# Patient Record
Sex: FEMALE | Race: BLACK OR AFRICAN AMERICAN | Employment: FULL TIME | URBAN - METROPOLITAN AREA
[De-identification: names, ages, dates, MRNs, and addresses within clinical notes are randomized per-mention and may not be internally consistent; named-entity substitution may affect disease eponyms.]

---

## 2017-03-02 ENCOUNTER — OFFICE VISIT (OUTPATIENT)
Dept: INTERNAL MEDICINE CLINIC | Age: 37
End: 2017-03-02

## 2017-03-02 VITALS
DIASTOLIC BLOOD PRESSURE: 84 MMHG | RESPIRATION RATE: 18 BRPM | BODY MASS INDEX: 34.45 KG/M2 | HEIGHT: 65 IN | TEMPERATURE: 98.1 F | SYSTOLIC BLOOD PRESSURE: 123 MMHG | OXYGEN SATURATION: 98 % | HEART RATE: 80 BPM | WEIGHT: 206.8 LBS

## 2017-03-02 DIAGNOSIS — R68.89 COLD INTOLERANCE: ICD-10-CM

## 2017-03-02 DIAGNOSIS — G43.909 MIGRAINE WITHOUT STATUS MIGRAINOSUS, NOT INTRACTABLE, UNSPECIFIED MIGRAINE TYPE: ICD-10-CM

## 2017-03-02 DIAGNOSIS — R73.01 IFG (IMPAIRED FASTING GLUCOSE): ICD-10-CM

## 2017-03-02 DIAGNOSIS — B34.9 VIRAL SYNDROME: Primary | ICD-10-CM

## 2017-03-02 RX ORDER — TOPIRAMATE 50 MG/1
TABLET, FILM COATED ORAL
Qty: 60 TAB | Refills: 3 | Status: SHIPPED | OUTPATIENT
Start: 2017-03-02 | End: 2017-07-01 | Stop reason: SDUPTHER

## 2017-03-02 NOTE — PATIENT INSTRUCTIONS
Learning About Living Jamal  What is a living will? A living will is a legal form you use to write down the kind of care you want at the end of your life. It is used by the health professionals who will treat you if you aren't able to decide for yourself. If you put your wishes in writing, your loved ones and others will know what kind of care you want. They won't need to guess. This can ease your mind and be helpful to others. A living will is not the same as an estate or property will. An estate will explains what you want to happen with your money and property after you die. Is a living will a legal document? A living will is a legal document. Each state has its own laws about living smart. If you move to another state, make sure that your living will is legal in the state where you now live. Or you might use a universal form that has been approved by many states. This kind of form can sometimes be completed and stored online. Your electronic copy will then be available wherever you have a connection to the Internet. In most cases, doctors will respect your wishes even if you have a form from a different state. · You don't need an  to complete a living will. But legal advice can be helpful if your state's laws are unclear, your health history is complicated, or your family can't agree on what should be in your living will. · You can change your living will at any time. Some people find that their wishes about end-of-life care change as their health changes. · In addition to making a living will, think about completing a medical power of  form. This form lets you name the person you want to make end-of-life treatment decisions for you (your \"health care agent\") if you're not able to. Many hospitals and nursing homes will give you the forms you need to complete a living will and a medical power of .   · Your living will is used only if you can't make or communicate decisions for yourself anymore. If you become able to make decisions again, you can accept or refuse any treatment, no matter what you wrote in your living will. · Your state may offer an online registry. This is a place where you can store your living will online so the doctors and nurses who need to treat you can find it right away. What should you think about when creating a living will? Talk about your end-of-life wishes with your family members and your doctor. Let them know what you want. That way the people making decisions for you won't be surprised by your choices. Think about these questions as you make your living will:  · Do you know enough about life support methods that might be used? If not, talk to your doctor so you know what might be done if you can't breathe on your own, your heart stops, or you're unable to swallow. · What things would you still want to be able to do after you receive life-support methods? Would you want to be able to walk? To speak? To eat on your own? To live without the help of machines? · If you have a choice, where do you want to be cared for? In your home? At a hospital or nursing home? · Do you want certain Mandaen practices performed if you become very ill? · If you have a choice at the end of your life, where would you prefer to die? At home? In a hospital or nursing home? Somewhere else? · Would you prefer to be buried or cremated? · Do you want your organs to be donated after you die? What should you do with your living will? · Make sure that your family members and your health care agent have copies of your living will. · Give your doctor a copy of your living will to keep in your medical record. If you have more than one doctor, make sure that each one has a copy. · You may want to put a copy of your living will where it can be easily found. Where can you learn more? Go to http://keely-kacie.info/.   Enter C301 in the search box to learn more about \"Learning About Living Jamal. \"  Current as of: February 24, 2016  Content Version: 11.1  © 7622-6941 [employer]. Care instructions adapted under license by [employer] (which disclaims liability or warranty for this information). If you have questions about a medical condition or this instruction, always ask your healthcare professional. [address] any warranty or liability for your use of this information. Viral Infections: Care Instructions  Your Care Instructions  You don't feel well, but it's not clear what's causing it. You may have a viral infection. Viruses cause many illnesses, such as the common cold, influenza, fever, rashes, and the diarrhea, nausea, and vomiting that are often called \"stomach flu. \" You may wonder if antibiotic medicines could make you feel better. But antibiotics only treat infections caused by bacteria. They don't work on viruses. The good news is that viral infections usually aren't serious. Most will go away in a few days without medical treatment. In the meantime, there are a few things you can do to make yourself more comfortable. Follow-up care is a key part of your treatment and safety. Be sure to make and go to all appointments, and call your doctor if you are having problems. It's also a good idea to know your test results and keep a list of the medicines you take. How can you care for yourself at home? · Get plenty of rest if you feel tired. · Take an over-the-counter pain medicine if needed, such as acetaminophen (Tylenol), ibuprofen (Advil, Motrin), or naproxen (Aleve). Read and follow all instructions on the label. · Be careful when taking over-the-counter cold or flu medicines and Tylenol at the same time. Many of these medicines have acetaminophen, which is Tylenol. Read the labels to make sure that you are not taking more than the recommended dose. Too much acetaminophen (Tylenol) can be harmful.   · Drink plenty of fluids, enough so that your urine is light yellow or clear like water. If you have kidney, heart, or liver disease and have to limit fluids, talk with your doctor before you increase the amount of fluids you drink. · Stay home from work, school, and other public places while you have a fever. When should you call for help? Call 911 anytime you think you may need emergency care. For example, call if:  · You have severe trouble breathing. · You passed out (lost consciousness). Call your doctor now or seek immediate medical care if:  · You seem to be getting much sicker. · You have a new or higher fever. · You have blood in your stools. · You have new belly pain, or your pain gets worse. · You have a new rash. Watch closely for changes in your health, and be sure to contact your doctor if:  · You start to get better and then get worse. · You do not get better as expected. Where can you learn more? Go to http://keely-kacie.info/. Enter W142 in the search box to learn more about \"Viral Infections: Care Instructions. \"  Current as of: May 24, 2016  Content Version: 11.1  © 3104-1233 [employer], "[employer]. Care instructions adapted under license by [employer] (which disclaims liability or warranty for this information). If you have questions about a medical condition or this instruction, always ask your healthcare professional. [place] [address] any warranty or liability for your use of this information.

## 2017-03-02 NOTE — MR AVS SNAPSHOT
Visit Information Date & Time Provider Department Dept. Phone Encounter #  
 3/2/2017 11:30 AM Troy Morales [id number] [phone] [id number] Upcoming Health Maintenance Date Due Pneumococcal 19-64 Medium Risk (1 of 1 - PPSV23) 1/5/2000 DTaP/Tdap/Td series (1 - Tdap) 1/5/2002 INFLUENZA AGE 9 TO ADULT 8/1/2016 PAP AKA CERVICAL CYTOLOGY 2/18/2018 Allergies as of 3/2/2017  Review Complete On: 3/2/2017 By: Babak Brooks NP Severity Noted Reaction Type Reactions Penicillin G  12/15/2012    Rash Current Immunizations  Reviewed on 3/24/2014 Name Date Influenza Vaccine (Quad) PF  Incomplete MMR 7/27/2013  1:35 PM  
 TB Skin Test (PPD) Intradermal 3/21/2014 Not reviewed this visit You Were Diagnosed With   
  
 Codes Comments Viral syndrome    -  Primary ICD-10-CM: B34.9 ICD-9-CM: 079.99 Migraine without status migrainosus, not intractable, unspecified migraine type     ICD-10-CM: G43.909 ICD-9-CM: 346.90 IFG (impaired fasting glucose)     ICD-10-CM: R73.01 
ICD-9-CM: 790.21 Cold intolerance     ICD-10-CM: R68.89 ICD-9-CM: 780.99 Vitals BP  
  
  
  
  
  
 123/84 (BP 1 Location: Right arm, BP Patient Position: Sitting) BMI and BSA Data Body Mass Index Body Surface Area 34.41 kg/m 2 2.07 m 2 Preferred Pharmacy Pharmacy Name Phone [id number] [phone] Your Updated Medication List  
  
   
This list is accurate as of: 3/2/17 12:19 PM.  Always use your most recent med list.  
  
  
  
  
 SLOW FE PO Take 1 Tab by mouth daily. topiramate 50 mg tablet Commonly known as:  TOPAMAX TAKE 1 TABLET BY MOUTH TWICE A DAY Prescriptions Sent to Pharmacy Refills  
 topiramate (TOPAMAX) 50 mg tablet 3 Sig: TAKE 1 TABLET BY MOUTH TWICE A DAY  Class: Normal  
 Pharmacy: [facility][id number] [address], [address] [facility] #: [phone] We Performed the Following CBC WITH AUTOMATED DIFF [05396 CPT(R)] HEMOGLOBIN A1C WITH EAG [43152 CPT(R)] LIPID PANEL [52300 CPT(R)] METABOLIC PANEL, COMPREHENSIVE [30897 CPT(R)] TSH 3RD GENERATION [56575 CPT(R)] Patient Instructions [address] What is a living will? A living will is a legal form you use to write down the kind of care you want at the end of your life. It is used by the health professionals who will treat you if you aren't able to decide for yourself. If you put your wishes in writing, your loved ones and others will know what kind of care you want. They won't need to guess. This can ease your mind and be helpful to others. A living will is not the same as an estate or property will. An estate will explains what you want to happen with your money and property after you die. Is a living will a legal document? A living will is a legal document. Each state has its own laws about living smart. If you move to another state, make sure that your living will is legal in the state where you now live. Or you might use a universal form that has been approved by many states. This kind of form can sometimes be completed and stored online. Your electronic copy will then be available wherever you have a connection to the Internet. In most cases, doctors will respect your wishes even if you have a form from a different state. · You don't need an  to complete a living will. But legal advice can be helpful if your state's laws are unclear, your health history is complicated, or your family can't agree on what should be in your living will. · You can change your living will at any time. Some people find that their wishes about end-of-life care change as their health changes. · In addition to making a living will, think about completing a medical power of  form. This form lets you name the person you want to make end-of-life treatment decisions for you (your \"health care agent\") if you're not able to. Many hospitals and nursing homes will give you the forms you need to complete a living will and a medical power of . · Your living will is used only if you can't make or communicate decisions for yourself anymore. If you become able to make decisions again, you can accept or refuse any treatment, no matter what you wrote in your living will. · Your state may offer an online registry. This is a place where you can store your living will online so the doctors and nurses who need to treat you can find it right away. What should you think about when creating a living will? Talk about your end-of-life wishes with your family members and your doctor. Let them know what you want. That way the people making decisions for you won't be surprised by your choices. Think about these questions as you make your living will: · Do you know enough about life support methods that might be used? If not, talk to your doctor so you know what might be done if you can't breathe on your own, your heart stops, or you're unable to swallow. · What things would you still want to be able to do after you receive life-support methods? Would you want to be able to walk? To speak? To eat on your own? To live without the help of machines? · If you have a choice, where do you want to be cared for? In your home? At a hospital or nursing home? · Do you want certain Anabaptism practices performed if you become very ill? · If you have a choice at the end of your life, where would you prefer to die? At home? In a hospital or nursing home? Somewhere else? · Would you prefer to be buried or cremated? · Do you want your organs to be donated after you die? What should you do with your living will? · Make sure that your family members and your health care agent have copies of your living will. · Give your doctor a copy of your living will to keep in your medical record. If you have more than one doctor, make sure that each one has a copy. · You may want to put a copy of your living will where it can be easily found. Where can you learn more? Go to http://keely-kacie.info/. Enter L502 in the search box to learn more about \"Learning About Living Jamal. \" Current as of: February 24, 2016 Content Version: 11.1 © 7398-6616 [employer]. Care instructions adapted under license by [employer] (which disclaims liability or warranty for this information). If you have questions about a medical condition or this instruction, always ask your healthcare professional. [address] any warranty or liability for your use of this information. Viral Infections: Care Instructions Your Care Instructions You don't feel well, but it's not clear what's causing it. You may have a viral infection. Viruses cause many illnesses, such as the common cold, influenza, fever, rashes, and the diarrhea, nausea, and vomiting that are often called \"stomach flu. \" You may wonder if antibiotic medicines could make you feel better. But antibiotics only treat infections caused by bacteria. They don't work on viruses. The good news is that viral infections usually aren't serious. Most will go away in a few days without medical treatment. In the meantime, there are a few things you can do to make yourself more comfortable. Follow-up care is a key part of your treatment and safety. Be sure to make and go to all appointments, and call your doctor if you are having problems. It's also a good idea to know your test results and keep a list of the medicines you take. How can you care for yourself at home? · Get plenty of rest if you feel tired. · Take an over-the-counter pain medicine if needed, such as acetaminophen (Tylenol), ibuprofen (Advil, Motrin), or naproxen (Aleve). Read and follow all instructions on the label. · Be careful when taking over-the-counter cold or flu medicines and Tylenol at the same time. Many of these medicines have acetaminophen, which is Tylenol. Read the labels to make sure that you are not taking more than the recommended dose. Too much acetaminophen (Tylenol) can be harmful. · Drink plenty of fluids, enough so that your urine is light yellow or clear like water. If you have kidney, heart, or liver disease and have to limit fluids, talk with your doctor before you increase the amount of fluids you drink. · Stay home from work, school, and other public places while you have a fever. When should you call for help? Call 911 anytime you think you may need emergency care. For example, call if: 
· You have severe trouble breathing. · You passed out (lost consciousness). Call your doctor now or seek immediate medical care if: 
· You seem to be getting much sicker. · You have a new or higher fever. · You have blood in your stools. · You have new belly pain, or your pain gets worse. · You have a new rash. Watch closely for changes in your health, and be sure to contact your doctor if: 
· You start to get better and then get worse. · You do not get better as expected. Where can you learn more? Go to http://keely-kacie.info/. Enter A921 in the search box to learn more about \"Viral Infections: Care Instructions. \" Current as of: May 24, 2016 Content Version: 11.1 © 9205-0150 [employer]. Care instructions adapted under license by [employer] (which disclaims liability or warranty for this information).  If you have questions about a medical condition or this instruction, always ask your healthcare professional. [employer] disclaims any warranty or liability for your use of this information. Introducing [facility]! Dear Laura Burnette: Thank you for requesting a Leadjini account. Our records indicate that you have previously registered for a Leadjini account but its currently inactive. Please call our Leadjini support line at [phone]. Additional Information If you have questions, please visit the Frequently Asked Questions section of the Leadjini website at [email or web address]/Tears for Lifet/. Remember, Leadjini is NOT to be used for urgent needs. For medical emergencies, dial 911. Now available from your iPhone and Android! Please provide this summary of care documentation to your next provider. Your primary care clinician is listed as Kirsten Aguiar. If you have any questions after today's visit, please call [phone].

## 2017-03-02 NOTE — PROGRESS NOTES
HISTORY OF PRESENT ILLNESS  Kassi Barker is a 40 y.o. female. HPI  Believes she is getting over a virus. less body ache than earlier this week. Diarrhea and vomiting resolved. Still fatigued. Denies sick contact. No influenza vaccine    Recurrent migraine type headcahes, OTC migraine medication effective    Dr. Tre Cazares gynecologist.. [facility] ablation to stop heavy bleeding, still bleeds but not as heavy. Still has cold intolerance. Not taking iron replacement. No recent evaluation of anemia. Past Medical History:   Diagnosis Date    Anemia NEC     on fe pill    Migraine     Postpartum depression     years ago       Current Outpatient Prescriptions on File Prior to Visit   Medication Sig Dispense Refill    FERROUS SULFATE (SLOW FE PO) Take 1 Tab by mouth daily. No current facility-administered medications on file prior to visit. Review of Systems   Constitutional: Positive for malaise/fatigue. Negative for chills, diaphoresis and fever. HENT: Negative for congestion and sore throat. Respiratory: Negative. Cardiovascular: Negative. Gastrointestinal: Negative. Genitourinary: Negative. Skin: Negative. Neurological: Positive for dizziness and headaches. Negative for weakness. Psychiatric/Behavioral: Negative. Physical Exam   Constitutional: She is oriented to person, place, and time. She appears well-developed and well-nourished. No distress. HENT:   Right Ear: External ear normal.   Left Ear: External ear normal.   Nose: Nose normal.   Mouth/Throat: Oropharynx is clear and moist. No oropharyngeal exudate. Eyes: Conjunctivae are normal. Right eye exhibits no discharge. Left eye exhibits no discharge. Neck: Normal range of motion. Neck supple. No thyromegaly present. Cardiovascular: Normal rate and regular rhythm. Pulmonary/Chest: Effort normal and breath sounds normal.   Musculoskeletal: She exhibits no edema or tenderness.    Lymphadenopathy:     She has no cervical adenopathy. Neurological: She is alert and oriented to person, place, and time. No cranial nerve deficit. Skin: Skin is warm and dry. She is not diaphoretic. Psychiatric: She has a normal mood and affect. Her behavior is normal.       ASSESSMENT and PLAN    ICD-10-CM ICD-9-CM    1. Viral syndrome B34.9 079.99    2. Migraine without status migrainosus, not intractable, unspecified migraine type G43.909 346.90 topiramate (TOPAMAX) 50 mg tablet   3. IFG (impaired fasting glucose) R73.01 790.21 LIPID PANEL      METABOLIC PANEL, COMPREHENSIVE      HEMOGLOBIN A1C WITH EAG   4. Cold intolerance R68.89 780.99 LIPID PANEL      TSH 3RD GENERATION      CBC WITH AUTOMATED DIFF     Follow-up Disposition:  Return if symptoms worsen or fail to improve. 1.Encouraged supportive care    2. Start prophylaxis medications    3. Instructed to take iron replacement twice daily.  Follow up with gynecologist to discuss DUB

## 2017-03-02 NOTE — PROGRESS NOTES
RM#8  Chief Complaint   Patient presents with    Flu      generalized body aches, pains, fatigued, vomitting &diarrhea x 2 days     1. Have you been to the ER, urgent care clinic since your last visit? Hospitalized since your last visit? No    2. Have you seen or consulted any other health care providers outside of the [facility] since your last visit? Include any pap smears or colon screening.  No  No living will ADV

## 2017-03-09 LAB
ALBUMIN SERPL-MCNC: 3.9 G/DL (ref 3.5–5.5)
ALBUMIN/GLOB SERPL: 1.3 {RATIO} (ref 1.1–2.5)
ALP SERPL-CCNC: 52 IU/L (ref 39–117)
ALT SERPL-CCNC: 15 IU/L (ref 0–32)
AST SERPL-CCNC: 15 IU/L (ref 0–40)
BASOPHILS # BLD AUTO: 0 X10E3/UL (ref 0–0.2)
BASOPHILS NFR BLD AUTO: 1 %
BILIRUB SERPL-MCNC: <0.2 MG/DL (ref 0–1.2)
BUN SERPL-MCNC: 14 MG/DL (ref 6–20)
BUN/CREAT SERPL: 20 (ref 8–20)
CALCIUM SERPL-MCNC: 8.7 MG/DL (ref 8.7–10.2)
CHLORIDE SERPL-SCNC: 106 MMOL/L (ref 96–106)
CHOLEST SERPL-MCNC: 101 MG/DL (ref 100–199)
CO2 SERPL-SCNC: 20 MMOL/L (ref 18–29)
CREAT SERPL-MCNC: 0.71 MG/DL (ref 0.57–1)
EOSINOPHIL # BLD AUTO: 0.1 X10E3/UL (ref 0–0.4)
EOSINOPHIL NFR BLD AUTO: 1 %
ERYTHROCYTE [DISTWIDTH] IN BLOOD BY AUTOMATED COUNT: 14.5 % (ref 12.3–15.4)
EST. AVERAGE GLUCOSE BLD GHB EST-MCNC: 134 MG/DL
GLOBULIN SER CALC-MCNC: 2.9 G/DL (ref 1.5–4.5)
GLUCOSE SERPL-MCNC: 101 MG/DL (ref 65–99)
HBA1C MFR BLD: 6.3 % (ref 4.8–5.6)
HCT VFR BLD AUTO: 38.9 % (ref 34–46.6)
HDLC SERPL-MCNC: 42 MG/DL
HGB BLD-MCNC: 12.8 G/DL (ref 11.1–15.9)
IMM GRANULOCYTES # BLD: 0 X10E3/UL (ref 0–0.1)
IMM GRANULOCYTES NFR BLD: 0 %
LDLC SERPL CALC-MCNC: 46 MG/DL (ref 0–99)
LYMPHOCYTES # BLD AUTO: 2.8 X10E3/UL (ref 0.7–3.1)
LYMPHOCYTES NFR BLD AUTO: 44 %
MCH RBC QN AUTO: 28 PG (ref 26.6–33)
MCHC RBC AUTO-ENTMCNC: 32.9 G/DL (ref 31.5–35.7)
MCV RBC AUTO: 85 FL (ref 79–97)
MONOCYTES # BLD AUTO: 0.5 X10E3/UL (ref 0.1–0.9)
MONOCYTES NFR BLD AUTO: 7 %
NEUTROPHILS # BLD AUTO: 2.9 X10E3/UL (ref 1.4–7)
NEUTROPHILS NFR BLD AUTO: 47 %
PLATELET # BLD AUTO: 292 X10E3/UL (ref 150–379)
POTASSIUM SERPL-SCNC: 4.2 MMOL/L (ref 3.5–5.2)
PROT SERPL-MCNC: 6.8 G/DL (ref 6–8.5)
RBC # BLD AUTO: 4.57 X10E6/UL (ref 3.77–5.28)
SODIUM SERPL-SCNC: 141 MMOL/L (ref 134–144)
TRIGL SERPL-MCNC: 65 MG/DL (ref 0–149)
TSH SERPL DL<=0.005 MIU/L-ACNC: 1.01 UIU/ML (ref 0.45–4.5)
VLDLC SERPL CALC-MCNC: 13 MG/DL (ref 5–40)
WBC # BLD AUTO: 6.2 X10E3/UL (ref 3.4–10.8)

## 2017-04-02 ENCOUNTER — APPOINTMENT (OUTPATIENT)
Dept: ULTRASOUND IMAGING | Age: 37
End: 2017-04-02
Attending: EMERGENCY MEDICINE
Payer: COMMERCIAL

## 2017-04-02 ENCOUNTER — HOSPITAL ENCOUNTER (EMERGENCY)
Age: 37
Discharge: HOME | End: 2017-04-02
Attending: EMERGENCY MEDICINE
Payer: COMMERCIAL

## 2017-04-02 VITALS
DIASTOLIC BLOOD PRESSURE: 77 MMHG | HEIGHT: 65 IN | RESPIRATION RATE: 20 BRPM | BODY MASS INDEX: 34.34 KG/M2 | HEART RATE: 88 BPM | OXYGEN SATURATION: 100 % | SYSTOLIC BLOOD PRESSURE: 133 MMHG | TEMPERATURE: 98.3 F | WEIGHT: 206.13 LBS

## 2017-04-02 DIAGNOSIS — D25.9 UTERINE LEIOMYOMA, UNSPECIFIED LOCATION: Primary | ICD-10-CM

## 2017-04-02 LAB
ALBUMIN SERPL BCP-MCNC: 3.6 G/DL (ref 3.5–5)
ALBUMIN/GLOB SERPL: 1 {RATIO} (ref 1.1–2.2)
ALP SERPL-CCNC: 56 U/L (ref 45–117)
ALT SERPL-CCNC: 19 U/L (ref 12–78)
ANION GAP BLD CALC-SCNC: 12 MMOL/L (ref 5–15)
APPEARANCE UR: ABNORMAL
AST SERPL W P-5'-P-CCNC: 10 U/L (ref 15–37)
BACTERIA URNS QL MICRO: ABNORMAL /HPF
BASOPHILS # BLD AUTO: 0 K/UL (ref 0–0.1)
BASOPHILS # BLD: 0 % (ref 0–1)
BILIRUB SERPL-MCNC: 0.1 MG/DL (ref 0.2–1)
BILIRUB UR QL: NEGATIVE
BUN SERPL-MCNC: 9 MG/DL (ref 6–20)
BUN/CREAT SERPL: 12 (ref 12–20)
CALCIUM SERPL-MCNC: 8.2 MG/DL (ref 8.5–10.1)
CHLORIDE SERPL-SCNC: 110 MMOL/L (ref 97–108)
CLUE CELLS VAG QL WET PREP: NORMAL
CO2 SERPL-SCNC: 22 MMOL/L (ref 21–32)
COLOR UR: ABNORMAL
CREAT SERPL-MCNC: 0.76 MG/DL (ref 0.55–1.02)
EOSINOPHIL # BLD: 0.1 K/UL (ref 0–0.4)
EOSINOPHIL NFR BLD: 1 % (ref 0–7)
EPITH CASTS URNS QL MICRO: ABNORMAL /LPF
ERYTHROCYTE [DISTWIDTH] IN BLOOD BY AUTOMATED COUNT: 15 % (ref 11.5–14.5)
GLOBULIN SER CALC-MCNC: 3.7 G/DL (ref 2–4)
GLUCOSE SERPL-MCNC: 92 MG/DL (ref 65–100)
GLUCOSE UR STRIP.AUTO-MCNC: NEGATIVE MG/DL
HCG UR QL: NEGATIVE
HCT VFR BLD AUTO: 37.7 % (ref 35–47)
HGB BLD-MCNC: 12.4 G/DL (ref 11.5–16)
HGB UR QL STRIP: NEGATIVE
HYALINE CASTS URNS QL MICRO: ABNORMAL /LPF (ref 0–5)
KETONES UR QL STRIP.AUTO: NEGATIVE MG/DL
KOH PREP SPEC: NORMAL
LEUKOCYTE ESTERASE UR QL STRIP.AUTO: ABNORMAL
LYMPHOCYTES # BLD AUTO: 41 % (ref 12–49)
LYMPHOCYTES # BLD: 3 K/UL (ref 0.8–3.5)
MCH RBC QN AUTO: 27.5 PG (ref 26–34)
MCHC RBC AUTO-ENTMCNC: 32.9 G/DL (ref 30–36.5)
MCV RBC AUTO: 83.6 FL (ref 80–99)
MONOCYTES # BLD: 0.6 K/UL (ref 0–1)
MONOCYTES NFR BLD AUTO: 8 % (ref 5–13)
NEUTS SEG # BLD: 3.7 K/UL (ref 1.8–8)
NEUTS SEG NFR BLD AUTO: 50 % (ref 32–75)
NITRITE UR QL STRIP.AUTO: NEGATIVE
PH UR STRIP: 6 [PH] (ref 5–8)
PLATELET # BLD AUTO: 263 K/UL (ref 150–400)
POTASSIUM SERPL-SCNC: 3.8 MMOL/L (ref 3.5–5.1)
PROT SERPL-MCNC: 7.3 G/DL (ref 6.4–8.2)
PROT UR STRIP-MCNC: NEGATIVE MG/DL
RBC # BLD AUTO: 4.51 M/UL (ref 3.8–5.2)
RBC #/AREA URNS HPF: ABNORMAL /HPF (ref 0–5)
SERVICE CMNT-IMP: NORMAL
SODIUM SERPL-SCNC: 144 MMOL/L (ref 136–145)
SP GR UR REFRACTOMETRY: 1.02 (ref 1–1.03)
T VAGINALIS VAG QL WET PREP: NORMAL
UA: UC IF INDICATED,UAUC: ABNORMAL
UROBILINOGEN UR QL STRIP.AUTO: 0.2 EU/DL (ref 0.2–1)
WBC # BLD AUTO: 7.3 K/UL (ref 3.6–11)
WBC URNS QL MICRO: ABNORMAL /HPF (ref 0–4)

## 2017-04-02 PROCEDURE — 87210 SMEAR WET MOUNT SALINE/INK: CPT | Performed by: EMERGENCY MEDICINE

## 2017-04-02 PROCEDURE — 80053 COMPREHEN METABOLIC PANEL: CPT | Performed by: EMERGENCY MEDICINE

## 2017-04-02 PROCEDURE — 85025 COMPLETE CBC W/AUTO DIFF WBC: CPT | Performed by: EMERGENCY MEDICINE

## 2017-04-02 PROCEDURE — 87491 CHLMYD TRACH DNA AMP PROBE: CPT | Performed by: EMERGENCY MEDICINE

## 2017-04-02 PROCEDURE — 99284 EMERGENCY DEPT VISIT MOD MDM: CPT

## 2017-04-02 PROCEDURE — 87086 URINE CULTURE/COLONY COUNT: CPT | Performed by: EMERGENCY MEDICINE

## 2017-04-02 PROCEDURE — 74011250637 HC RX REV CODE- 250/637: Performed by: EMERGENCY MEDICINE

## 2017-04-02 PROCEDURE — 81001 URINALYSIS AUTO W/SCOPE: CPT | Performed by: EMERGENCY MEDICINE

## 2017-04-02 PROCEDURE — 36415 COLL VENOUS BLD VENIPUNCTURE: CPT | Performed by: EMERGENCY MEDICINE

## 2017-04-02 PROCEDURE — 81025 URINE PREGNANCY TEST: CPT

## 2017-04-02 PROCEDURE — 76856 US EXAM PELVIC COMPLETE: CPT

## 2017-04-02 PROCEDURE — 76830 TRANSVAGINAL US NON-OB: CPT

## 2017-04-02 RX ORDER — HYDROCODONE BITARTRATE AND ACETAMINOPHEN 7.5; 325 MG/1; MG/1
1 TABLET ORAL
Status: COMPLETED | OUTPATIENT
Start: 2017-04-02 | End: 2017-04-02

## 2017-04-02 RX ORDER — HYDROCODONE BITARTRATE AND ACETAMINOPHEN 7.5; 325 MG/1; MG/1
1 TABLET ORAL
Qty: 20 TAB | Refills: 0 | Status: SHIPPED | OUTPATIENT
Start: 2017-04-02 | End: 2017-09-08

## 2017-04-02 RX ADMIN — HYDROCODONE BITARTRATE AND ACETAMINOPHEN 1 TABLET: 7.5; 325 TABLET ORAL at 10:37

## 2017-04-02 NOTE — ED PROVIDER NOTES
HPI Comments: Connie Parikh is a 40 y.o. female, pmhx significant for fibroids, and anemia, who presents ambulatory to the ED c/o suprapubic and right pelvic pain x 3 days. Pt reports an increase in swelling in her RLQ and right pelvic region, stating that she had a difficult time buttoning her jeans which usually fit loosely. Pt states that her pain is made worse with strenuous movements, noting that she has relief when she stops walking, but the pain immediately returns when she starts walking. Pt endorses concern that this is a flare up of her fibroids, which she hasn't had since she gave birth to her son. Pt states that she had the NovaSure procedure, which relieved some of her fibroid sxs. Pt notes that her LMP ended on 3/27/17. Pt denies dysuria, nausea, vomiting, vaginal bleeding, abnormal changes to her appetite, or concern for STD. PCP: Helena García NP  OB GYN: Ophelia Burns MD    PSHx: Significant for dilation and curettage x 4, bilateral tubal ligation  Social Hx: + tobacco (1ppd), + EtOH (weekly), - Illicit Drugs (cocaine - last use 2015)    There are no other complaints, changes, or physical findings at this time. The history is provided by the patient. No  was used.         Past Medical History:   Diagnosis Date    Anemia NEC     on fe pill    Migraine     Postpartum depression     years ago       Past Surgical History:   Procedure Laterality Date    HX DILATION AND CURETTAGE  1998,2000,2001,2003    x4    HX GYN      BTL 2013         Family History:   Problem Relation Age of Onset    Diabetes Mother     Hypertension Mother     High Cholesterol Mother     Stroke Mother     Cancer Maternal Grandmother    [facility] Migraines Sister     Migraines Brother        Social History     Social History    Marital status:      Spouse name: Micah Aquino Number of children: 1    Years of education: 15     Occupational History     Not Employed     Social History Main Topics    Smoking status: Current Every Day Smoker     Packs/day: 1.00     Years: 10.00     Last attempt to quit: 4/8/2013    Smokeless tobacco: Never Used    Alcohol use 3.0 oz/week     2 Cans of beer, 3 Shots of liquor per week    Drug use: 1.00 per week     Special: Cocaine      Comment: last cocaine use 2005 per 10/1/15 PAT call    Sexual activity: Yes     Partners: Male     Birth control/ protection: Surgical      Comment: tubes tied     Other Topics Concern     Service No    Blood Transfusions No    Caffeine Concern No    Occupational Exposure No    Hobby Hazards No    Sleep Concern No    Stress Concern Yes    Weight Concern No    Special Diet No    Back Care No    Exercise No    Seat Belt Yes    Self-Exams Yes     Social History Narrative         ALLERGIES: Penicillin g    Review of Systems   Constitutional: Negative for chills, fatigue and fever. HENT: Negative for congestion, rhinorrhea and sore throat. Eyes: Negative for pain, discharge and visual disturbance. Respiratory: Negative for cough, chest tightness, shortness of breath and wheezing. Cardiovascular: Negative for chest pain, palpitations and leg swelling. Gastrointestinal: Positive for abdominal pain (suprapubic). Negative for constipation, diarrhea, nausea and vomiting. Genitourinary: Positive for pelvic pain. Negative for dysuria, hematuria, vaginal bleeding and vaginal discharge. Musculoskeletal: Negative for arthralgias, back pain and myalgias. Skin: Negative for rash. Neurological: Negative for dizziness, weakness, light-headedness and headaches. Psychiatric/Behavioral: Negative. Vitals:    04/02/17 1001 04/02/17 1425   BP: 133/77    Pulse: 88    Resp: 20    Temp: 98.3 °F (36.8 °C)    SpO2: 99% 100%   Weight: 93.5 kg (206 lb 2.1 oz)    Height: 5' 5\" (1.651 m)             Physical Exam   Constitutional: She is oriented to person, place, and time.  She appears well-developed and well-nourished. No distress. HENT:   Head: Normocephalic and atraumatic. Eyes: EOM are normal. Right eye exhibits no discharge. Left eye exhibits no discharge. No scleral icterus. Neck: Normal range of motion. Neck supple. No tracheal deviation present. Cardiovascular: Normal rate, regular rhythm, normal heart sounds and intact distal pulses. Exam reveals no gallop and no friction rub. No murmur heard. Pulmonary/Chest: Effort normal and breath sounds normal. No respiratory distress. She has no wheezes. She has no rales. Abdominal: Soft. She exhibits no distension. There is tenderness. There is no rebound and no guarding. Suprapubic and right pelvic pain   Genitourinary:   Genitourinary Comments: PELVIC EXAM:   Right adnexal tenderness  Left adnexal non-tender  No discharge  No cervical motion tenderness    Musculoskeletal: Normal range of motion. She exhibits no edema. Lymphadenopathy:     She has no cervical adenopathy. Neurological: She is alert and oriented to person, place, and time. No focal neuro deficits   Skin: Skin is warm and dry. No rash noted. Psychiatric: She has a normal mood and affect. Nursing note and vitals reviewed. MDM  Number of Diagnoses or Management Options  Uterine leiomyoma, unspecified location:   Diagnosis management comments:     Differentia includes fibroids, pregnancy, ovarian cyst/torsion, STI, PID, UTI, appendicitis. - CBC, CMP  - UA, UPT  - Pelvic exam and labs  - TVUS         Amount and/or Complexity of Data Reviewed  Clinical lab tests: reviewed and ordered  Review and summarize past medical records: yes    Patient Progress  Patient progress: stable    ED Course       Procedures     PROGRESS NOTE   12:00 PM  Pt re-evaluated prior to pelvic exam. She states that her pain has improved after medications given to her in the ED.    Written by Jazzmine Jackson ED Scribe, as dictated by Leeanna Rey MD.     Procedure Note - Pelvic Exam: 12:04 PM  Performed by: Marixa Fairbanks MD  Chaperoned by: Leora Lopez RN  Pelvic exam was performed using bimanual and speculum. Further findings noted in physical exam.   The procedure took 1-15 minutes, and pt tolerated well. Written by Rozanna Cooks, ED Scribe, as dictated by Marixa Fairbanks MD.    PROGRESS NOTE   2:23 PM  Pt was re-evaluated. Pt states that her pain is more suprapubic, and consistent with prior episodes of pain associated with fibroids. On exam, pain is more suprapubic with no RLQ tenderness. Do not suspect appendicitis at this time. Pain is likely secondary to fibroids. Will discharge with f/u with OB GYN - pt is in agreement with plan. Discussed results, prescriptions and follow up plan with patient. Provided customary return to ED instructions. Patient expressed understanding. Erna Lackey MD    LABORATORY TESTS:  Recent Results (from the past 12 hour(s))   HCG URINE, QL. - POC    Collection Time: 04/02/17 10:38 AM   Result Value Ref Range    Pregnancy test,urine (POC) NEGATIVE  NEG     CBC WITH AUTOMATED DIFF    Collection Time: 04/02/17 10:40 AM   Result Value Ref Range    WBC 7.3 3.6 - 11.0 K/uL    RBC 4.51 3.80 - 5.20 M/uL    HGB 12.4 11.5 - 16.0 g/dL    HCT 37.7 35.0 - 47.0 %    MCV 83.6 80.0 - 99.0 FL    MCH 27.5 26.0 - 34.0 PG    MCHC 32.9 30.0 - 36.5 g/dL    RDW 15.0 (H) 11.5 - 14.5 %    PLATELET 470 275 - 485 K/uL    NEUTROPHILS 50 32 - 75 %    LYMPHOCYTES 41 12 - 49 %    MONOCYTES 8 5 - 13 %    EOSINOPHILS 1 0 - 7 %    BASOPHILS 0 0 - 1 %    ABS. NEUTROPHILS 3.7 1.8 - 8.0 K/UL    ABS. LYMPHOCYTES 3.0 0.8 - 3.5 K/UL    ABS. MONOCYTES 0.6 0.0 - 1.0 K/UL    ABS. EOSINOPHILS 0.1 0.0 - 0.4 K/UL    ABS.  BASOPHILS 0.0 0.0 - 0.1 K/UL   METABOLIC PANEL, COMPREHENSIVE    Collection Time: 04/02/17 10:40 AM   Result Value Ref Range    Sodium 144 136 - 145 mmol/L    Potassium 3.8 3.5 - 5.1 mmol/L    Chloride 110 (H) 97 - 108 mmol/L    CO2 22 21 - 32 mmol/L Anion gap 12 5 - 15 mmol/L    Glucose 92 65 - 100 mg/dL    BUN 9 6 - 20 MG/DL    Creatinine 0.76 0.55 - 1.02 MG/DL    BUN/Creatinine ratio 12 12 - 20      GFR est AA >60 >60 ml/min/1.73m2    GFR est non-AA >60 >60 ml/min/1.73m2    Calcium 8.2 (L) 8.5 - 10.1 MG/DL    Bilirubin, total 0.1 (L) 0.2 - 1.0 MG/DL    ALT (SGPT) 19 12 - 78 U/L    AST (SGOT) 10 (L) 15 - 37 U/L    Alk. phosphatase 56 45 - 117 U/L    Protein, total 7.3 6.4 - 8.2 g/dL    Albumin 3.6 3.5 - 5.0 g/dL    Globulin 3.7 2.0 - 4.0 g/dL    A-G Ratio 1.0 (L) 1.1 - 2.2     URINALYSIS W/ REFLEX CULTURE    Collection Time: 04/02/17 10:40 AM   Result Value Ref Range    Color YELLOW/STRAW      Appearance CLOUDY (A) CLEAR      Specific gravity 1.022 1.003 - 1.030      pH (UA) 6.0 5.0 - 8.0      Protein NEGATIVE  NEG mg/dL    Glucose NEGATIVE  NEG mg/dL    Ketone NEGATIVE  NEG mg/dL    Bilirubin NEGATIVE  NEG      Blood NEGATIVE  NEG      Urobilinogen 0.2 0.2 - 1.0 EU/dL    Nitrites NEGATIVE  NEG      Leukocyte Esterase SMALL (A) NEG      WBC 10-20 0 - 4 /hpf    RBC 0-5 0 - 5 /hpf    Epithelial cells MODERATE (A) FEW /lpf    Bacteria 1+ (A) NEG /hpf    UA:UC IF INDICATED URINE CULTURE ORDERED (A) CNI      Hyaline cast 0-2 0 - 5 /lpf   DARIANA, OTHER SOURCES    Collection Time: 04/02/17 10:40 AM   Result Value Ref Range    Special Requests: NO SPECIAL REQUESTS      KOH NO YEAST SEEN     WET PREP    Collection Time: 04/02/17 12:05 PM   Result Value Ref Range    Clue cells CLUE CELLS ABSENT      Wet prep NO TRICHOMONAS SEEN         IMAGING RESULTS:  US PELV NON OBS   Final Result     INDICATION: Right pelvic pain.     COMPARISON: OB ultrasound 2/8/2013.     ULTRASOUND PELVIC TRANSABDOMINAL:   Multiple sonographic real time images were obtained with transabdominal  technique. The urinary bladder is minimally distended. The uterus measures 6.4 x 7.9 x 10.3 cm.   Uterine fibroids, a submucous measuring 2.8 x 3.1 x 3.4, a fundal pedunculated  measuring 3.8 x 4.0 x 5.4 cm and a possible left pedunculated measuring 5.6 x  6.1 x 6 0.8 cm are seen. The endometrial stripe is not visualized scattered about the fibroid and by the  lack of adequate distention of the urinary bladder  The right ovary measures 2.8 x 2.2 x 3.5 cm. The left ovary is not definitely identified obscured by bowel gas. .     ULTRASOUND PELVIC TRANSVAGINAL:   Multiple sonographic real time images were obtained with transvaginal technique  to better visualize the uterus and right ovary and to attempt to visualize the  left ovary. Cervical nabothian cysts are noted. The uterus measures 5.5 x 6.9 x 10.1 cm. The uterus shows fibroids including a 3.6 x 3.8 x 4.2 cm submucous fibroid and  5.8 x 5.9 x 6.0 cm pedunculated fibroid. The endometrial stripe measures 0.6 cm trace fluid present. The right ovary is obscured by bowel gas. The left ovary measures 1.2 x 2.5 x 3.8 cm and shows color. There is no free fluid         IMPRESSION  IMPRESSION:   Enlarged uterus with submucosal and pedunculated fibroids. US TRANSVAGINAL   Final Result     INDICATION: Right pelvic pain.     COMPARISON: OB ultrasound 2/8/2013.     ULTRASOUND PELVIC TRANSABDOMINAL:   Multiple sonographic real time images were obtained with transabdominal  technique. The urinary bladder is minimally distended. The uterus measures 6.4 x 7.9 x 10.3 cm. Uterine fibroids, a submucous measuring 2.8 x 3.1 x 3.4, a fundal pedunculated  measuring 3.8 x 4.0 x 5.4 cm and a possible left pedunculated measuring 5.6 x  6.1 x 6 0.8 cm are seen. The endometrial stripe is not visualized scattered about the fibroid and by the  lack of adequate distention of the urinary bladder  The right ovary measures 2.8 x 2.2 x 3.5 cm. The left ovary is not definitely identified obscured by bowel gas.   .     ULTRASOUND PELVIC TRANSVAGINAL:   Multiple sonographic real time images were obtained with transvaginal technique  to better visualize the uterus and right ovary and to attempt to visualize the  left ovary. Cervical nabothian cysts are noted. The uterus measures 5.5 x 6.9 x 10.1 cm. The uterus shows fibroids including a 3.6 x 3.8 x 4.2 cm submucous fibroid and  5.8 x 5.9 x 6.0 cm pedunculated fibroid. The endometrial stripe measures 0.6 cm trace fluid present. The right ovary is obscured by bowel gas. The left ovary measures 1.2 x 2.5 x 3.8 cm and shows color. There is no free fluid         IMPRESSION  IMPRESSION:   Enlarged uterus with submucosal and pedunculated fibroids. MEDICATIONS GIVEN:  Medications   HYDROcodone-acetaminophen (NORCO) 7.5-325 mg per tablet 1 Tab (1 Tab Oral Given 4/2/17 1037)       IMPRESSION:  1. Uterine leiomyoma, unspecified location        PLAN:  1. Discharge home. Current Discharge Medication List      START taking these medications    Details   HYDROcodone-acetaminophen (NORCO) 7.5-325 mg per tablet Take 1 Tab by mouth every six (6) hours as needed for Pain. Max Daily Amount: 4 Tabs. Qty: 20 Tab, Refills: 0           2. Follow-up Information     Follow up With Details Comments Contact Info    Dre Boles MD  Please follow up as soon as possible [address]  [address] [facility]  [phone]      [facility] EMERGENCY DEPT  As needed, If symptoms worsen [address] [place] [address]  [phone]        3. Return to ED if worse       DISCHARGE NOTE:  2:26 PM  Patient's results have been reviewed with them. Patient and/or family have verbally conveyed their understanding and agreement of the patient's signs, symptoms, diagnosis, treatment and prognosis and additionally agree to follow up as recommended or return to the Emergency Room should their condition change prior to follow-up.  Discharge instructions have also been provided to the patient with some educational information regarding their diagnosis as well a list of reasons why they would want to return to the ER prior to their follow-up appointment should their condition change. This note is prepared by Paola Cui, acting as Scribe for MD Rajiv Galeano MD: The scribe's documentation has been prepared under my direction and personally reviewed by me in its entirety. I confirm that the note above accurately reflects all work, treatment, procedures, and medical decision making performed by me.

## 2017-04-02 NOTE — DISCHARGE INSTRUCTIONS
Uterine Fibroids: Care Instructions  Your Care Instructions    Uterine fibroids are growths in the uterus. Fibroids aren't cancer. Doctors don't know what causes fibroids. Fibroids are very common in women during their childbearing years. Fibroids can grow on the inside of the uterus, in the muscle wall of the uterus, or near the outside wall of the uterus. In some women, fibroids cause painful cramps and heavy periods. In these cases, taking anti-inflammatory medicines, birth control pills, or using an intrauterine device (IUD) often helps decrease symptoms. Sometimes surgery is needed to treat fibroids. But if you are near menopause, you may want to wait and see if your symptoms get better. Most fibroids shrink and go away after menopause, when your menstrual periods stop completely. Follow-up care is a key part of your treatment and safety. Be sure to make and go to all appointments, and call your doctor if you are having problems. It's also a good idea to know your test results and keep a list of the medicines you take. How can you care for yourself at home? · If your doctor gave you medicine, take it as exactly as prescribed. Call your doctor if you think you are having a problem with your medicine. · Take anti-inflammatory medicines for pain. These include ibuprofen (Advil, Motrin) and naproxen (Aleve). Read and follow all instructions on the label. · Use heat, such as a hot water bottle or a heating pad set on low, or a warm bath to relax tense muscles and relieve cramping. Put a thin cloth between the heating pad and your skin. Never go to sleep with a heating pad on. · Lie down and put a pillow under your knees. Or, lie on your side and bring your knees up to your chest. These positions may help relieve belly pain or pressure. · Keep track of how many sanitary pads or tampons you use each day. · Get at least 30 minutes of exercise on most days of the week. Walking is a good choice.  You also may want to do other activities, such as running, swimming, cycling, or playing tennis or team sports. · If you bleed longer than usual or have heavy bleeding, take a daily multivitamin with iron. When should you call for help? Call 911 anytime you think you may need emergency care. For example, call if:  · You passed out (lost consciousness). · You have sudden, severe pain in your belly or pelvis. Call your doctor now or seek immediate medical care if:  · You have severe vaginal bleeding. This means that you are soaking through your usual pads each hour for 2 or more hours. · You have new belly or pelvic pain. · You are dizzy or lightheaded, or you feel like you may faint. · You have new or unexpected vaginal bleeding. Watch closely for changes in your health, and be sure to contact your doctor if:  · You have new vaginal discharge. · You have ongoing heavy or irregular vaginal bleeding. · You have pelvic pain or a heavy feeling in your lower belly that doesn't go away. · You have to urinate often. · You are more constipated than usual.  Where can you learn more? Go to http://keely-kacie.info/. Enter B121 in the search box to learn more about \"Uterine Fibroids: Care Instructions. \"  Current as of: October 13, 2016  Content Version: 11.2  © 4778-7546 [employer]. Care instructions adapted under license by [employer] (which disclaims liability or warranty for this information). If you have questions about a medical condition or this instruction, always ask your healthcare professional. [place] [address] any warranty or liability for your use of this information.

## 2017-04-03 LAB
BACTERIA SPEC CULT: NORMAL
C TRACH DNA SPEC QL NAA+PROBE: NEGATIVE
CC UR VC: NORMAL
N GONORRHOEA DNA SPEC QL NAA+PROBE: NEGATIVE
SAMPLE TYPE: NORMAL
SERVICE CMNT-IMP: NORMAL
SERVICE CMNT-IMP: NORMAL
SPECIMEN SOURCE: NORMAL

## 2017-07-01 DIAGNOSIS — G43.909 MIGRAINE WITHOUT STATUS MIGRAINOSUS, NOT INTRACTABLE, UNSPECIFIED MIGRAINE TYPE: ICD-10-CM

## 2017-07-03 RX ORDER — TOPIRAMATE 50 MG/1
TABLET, FILM COATED ORAL
Qty: 60 TAB | Refills: 3 | Status: SHIPPED | OUTPATIENT
Start: 2017-07-03 | End: 2017-09-29 | Stop reason: SDUPTHER

## 2017-09-08 ENCOUNTER — HOSPITAL ENCOUNTER (EMERGENCY)
Age: 37
Discharge: HOME | End: 2017-09-08
Attending: EMERGENCY MEDICINE
Payer: COMMERCIAL

## 2017-09-08 ENCOUNTER — APPOINTMENT (OUTPATIENT)
Dept: GENERAL RADIOLOGY | Age: 37
End: 2017-09-08
Attending: EMERGENCY MEDICINE
Payer: COMMERCIAL

## 2017-09-08 VITALS
DIASTOLIC BLOOD PRESSURE: 78 MMHG | RESPIRATION RATE: 16 BRPM | WEIGHT: 204.8 LBS | BODY MASS INDEX: 34.12 KG/M2 | SYSTOLIC BLOOD PRESSURE: 127 MMHG | TEMPERATURE: 98.3 F | HEART RATE: 73 BPM | HEIGHT: 65 IN | OXYGEN SATURATION: 100 %

## 2017-09-08 DIAGNOSIS — K59.00 CONSTIPATION, UNSPECIFIED CONSTIPATION TYPE: Primary | ICD-10-CM

## 2017-09-08 LAB
ALBUMIN SERPL-MCNC: 3.3 G/DL (ref 3.5–5)
ALBUMIN/GLOB SERPL: 0.8 {RATIO} (ref 1.1–2.2)
ALP SERPL-CCNC: 57 U/L (ref 45–117)
ALT SERPL-CCNC: 18 U/L (ref 12–78)
ANION GAP SERPL CALC-SCNC: 5 MMOL/L (ref 5–15)
APPEARANCE UR: ABNORMAL
AST SERPL-CCNC: 13 U/L (ref 15–37)
BASOPHILS # BLD: 0 K/UL (ref 0–0.1)
BASOPHILS NFR BLD: 0 % (ref 0–1)
BILIRUB SERPL-MCNC: 0.2 MG/DL (ref 0.2–1)
BILIRUB UR QL: NEGATIVE
BUN SERPL-MCNC: 9 MG/DL (ref 6–20)
BUN/CREAT SERPL: 13 (ref 12–20)
CALCIUM SERPL-MCNC: 8.5 MG/DL (ref 8.5–10.1)
CHLORIDE SERPL-SCNC: 113 MMOL/L (ref 97–108)
CO2 SERPL-SCNC: 24 MMOL/L (ref 21–32)
COLOR UR: ABNORMAL
CREAT SERPL-MCNC: 0.71 MG/DL (ref 0.55–1.02)
EOSINOPHIL # BLD: 0.1 K/UL (ref 0–0.4)
EOSINOPHIL NFR BLD: 1 % (ref 0–7)
ERYTHROCYTE [DISTWIDTH] IN BLOOD BY AUTOMATED COUNT: 14.5 % (ref 11.5–14.5)
GLOBULIN SER CALC-MCNC: 3.9 G/DL (ref 2–4)
GLUCOSE SERPL-MCNC: 85 MG/DL (ref 65–100)
GLUCOSE UR STRIP.AUTO-MCNC: NEGATIVE MG/DL
HCG UR QL: NEGATIVE
HCT VFR BLD AUTO: 37.9 % (ref 35–47)
HGB BLD-MCNC: 12.7 G/DL (ref 11.5–16)
HGB UR QL STRIP: NEGATIVE
KETONES UR QL STRIP.AUTO: NEGATIVE MG/DL
LEUKOCYTE ESTERASE UR QL STRIP.AUTO: NEGATIVE
LYMPHOCYTES # BLD: 2.9 K/UL (ref 0.8–3.5)
LYMPHOCYTES NFR BLD: 44 % (ref 12–49)
MCH RBC QN AUTO: 28.2 PG (ref 26–34)
MCHC RBC AUTO-ENTMCNC: 33.5 G/DL (ref 30–36.5)
MCV RBC AUTO: 84.2 FL (ref 80–99)
MONOCYTES # BLD: 0.5 K/UL (ref 0–1)
MONOCYTES NFR BLD: 7 % (ref 5–13)
NEUTS SEG # BLD: 3.2 K/UL (ref 1.8–8)
NEUTS SEG NFR BLD: 48 % (ref 32–75)
NITRITE UR QL STRIP.AUTO: NEGATIVE
PH UR STRIP: 7.5 [PH] (ref 5–8)
PLATELET # BLD AUTO: 251 K/UL (ref 150–400)
POTASSIUM SERPL-SCNC: 3.7 MMOL/L (ref 3.5–5.1)
PROT SERPL-MCNC: 7.2 G/DL (ref 6.4–8.2)
PROT UR STRIP-MCNC: NEGATIVE MG/DL
RBC # BLD AUTO: 4.5 M/UL (ref 3.8–5.2)
SODIUM SERPL-SCNC: 142 MMOL/L (ref 136–145)
SP GR UR REFRACTOMETRY: 1.02 (ref 1–1.03)
UROBILINOGEN UR QL STRIP.AUTO: 0.2 EU/DL (ref 0.2–1)
WBC # BLD AUTO: 6.6 K/UL (ref 3.6–11)

## 2017-09-08 PROCEDURE — 96374 THER/PROPH/DIAG INJ IV PUSH: CPT

## 2017-09-08 PROCEDURE — 96372 THER/PROPH/DIAG INJ SC/IM: CPT

## 2017-09-08 PROCEDURE — 74000 XR ABD (KUB): CPT

## 2017-09-08 PROCEDURE — 81025 URINE PREGNANCY TEST: CPT

## 2017-09-08 PROCEDURE — 85025 COMPLETE CBC W/AUTO DIFF WBC: CPT | Performed by: EMERGENCY MEDICINE

## 2017-09-08 PROCEDURE — 74011250637 HC RX REV CODE- 250/637: Performed by: EMERGENCY MEDICINE

## 2017-09-08 PROCEDURE — 81003 URINALYSIS AUTO W/O SCOPE: CPT | Performed by: EMERGENCY MEDICINE

## 2017-09-08 PROCEDURE — 80053 COMPREHEN METABOLIC PANEL: CPT | Performed by: EMERGENCY MEDICINE

## 2017-09-08 PROCEDURE — 99283 EMERGENCY DEPT VISIT LOW MDM: CPT

## 2017-09-08 PROCEDURE — 74011250636 HC RX REV CODE- 250/636: Performed by: EMERGENCY MEDICINE

## 2017-09-08 RX ORDER — DICYCLOMINE HYDROCHLORIDE 10 MG/ML
20 INJECTION INTRAMUSCULAR
Status: COMPLETED | OUTPATIENT
Start: 2017-09-08 | End: 2017-09-08

## 2017-09-08 RX ORDER — SIMETHICONE 80 MG
80 TABLET,CHEWABLE ORAL
Status: COMPLETED | OUTPATIENT
Start: 2017-09-08 | End: 2017-09-08

## 2017-09-08 RX ORDER — KETOROLAC TROMETHAMINE 30 MG/ML
30 INJECTION, SOLUTION INTRAMUSCULAR; INTRAVENOUS ONCE
Status: COMPLETED | OUTPATIENT
Start: 2017-09-08 | End: 2017-09-08

## 2017-09-08 RX ORDER — POLYETHYLENE GLYCOL 3350 17 G/17G
17 POWDER, FOR SOLUTION ORAL DAILY
Qty: 238 G | Refills: 0 | Status: SHIPPED | OUTPATIENT
Start: 2017-09-08 | End: 2018-04-18

## 2017-09-08 RX ADMIN — DICYCLOMINE HYDROCHLORIDE 20 MG: 20 INJECTION, SOLUTION INTRAMUSCULAR at 09:39

## 2017-09-08 RX ADMIN — KETOROLAC TROMETHAMINE 30 MG: 30 INJECTION, SOLUTION INTRAMUSCULAR at 09:39

## 2017-09-08 RX ADMIN — SODIUM CHLORIDE 1000 ML: 900 INJECTION, SOLUTION INTRAVENOUS at 09:38

## 2017-09-08 RX ADMIN — SIMETHICONE CHEW TAB 80 MG 80 MG: 80 TABLET ORAL at 10:04

## 2017-09-08 NOTE — ED PROVIDER NOTES
HPI Comments: 40 y.o. female with past medical history significant for anemia, postpartum depression, migraines, and fibroids who presents from home with chief complaint of abdominal pain. Pt states she has had sharp, constant R-sided abdominal pain for the last four days. Pt states she has had accompanying constipation, dysuria, difficulty urinating, and some light-headedness. Pt states her LBM was yesterday and she had to strain to pass it. Pt reports experiencing similar symptoms in the past associated with her uterine fibroids. Pt states she has been taking Etodolac 400 mg with minimal relief. Pt states she has been drinking fluids. Pt denies possibility of pregnancy. Pt notes she has an appointment scheduled with her new PCP, Dr. Zak Penny, in four days. Pt denies having vaginal bleeding, hematuria, fever, nausea, or vomiting. There are no other acute medical concerns at this time. PCP: Romulo Hawkins NP; Dr. Zak Penny     Note written by Lisseth Hawk, as dictated by Janiya Lima MD 9:10 AM    The history is provided by the patient.         Past Medical History:   Diagnosis Date    Anemia NEC     on fe pill    Fibroid, uterine     Migraine     Postpartum depression     years ago       Past Surgical History:   Procedure Laterality Date    HX DILATION AND CURETTAGE  1998,2000,2001,2003    x4    HX GYN      BTL 2013    HX GYN  2015    uterine ablation         Family History:   Problem Relation Age of Onset    Diabetes Mother     Hypertension Mother     High Cholesterol Mother     Stroke Mother     Cancer Maternal Grandmother     Migraines Sister     Migraines Brother        Social History     Social History    Marital status:      Spouse name: Osmany Barber Number of children: 1    Years of education: 15     Occupational History     Not Employed     Social History Main Topics    Smoking status: Current Every Day Smoker     Packs/day: 1.00 Years: 10.00     Last attempt to quit: 4/8/2013    Smokeless tobacco: Never Used    Alcohol use 3.0 oz/week     2 Cans of beer, 3 Shots of liquor per week    Drug use: 1.00 per week     Special: Cocaine      Comment: last cocaine use 2005 per 10/1/15 PAT call    Sexual activity: Yes     Partners: Male     Birth control/ protection: Surgical      Comment: tubes tied     Other Topics Concern     Service No    Blood Transfusions No    Caffeine Concern No    Occupational Exposure No    Hobby Hazards No    Sleep Concern No    Stress Concern Yes    Weight Concern No    Special Diet No    Back Care No    Exercise No    Seat Belt Yes    Self-Exams Yes     Social History Narrative         ALLERGIES: Penicillin g    Review of Systems   Constitutional: Negative for chills and fever. HENT: Negative for congestion, nosebleeds and rhinorrhea. Eyes: Negative for pain and redness. Respiratory: Negative for cough and shortness of breath. Cardiovascular: Negative for chest pain and palpitations. Gastrointestinal: Positive for abdominal pain and constipation. Negative for nausea and vomiting. Genitourinary: Positive for difficulty urinating and dysuria. Negative for frequency, hematuria, vaginal bleeding and vaginal pain. Musculoskeletal: Negative for myalgias. Skin: Negative for rash and wound. Neurological: Positive for light-headedness. Negative for seizures, syncope and weakness. Hematological: Does not bruise/bleed easily. Psychiatric/Behavioral: Negative for agitation, confusion, dysphoric mood and suicidal ideas. The patient is not nervous/anxious. All other systems reviewed and are negative. Vitals:    09/08/17 0907   BP: 127/78   Pulse: 73   Resp: 16   Temp: 98.3 °F (36.8 °C)   SpO2: 100%   Weight: 92.9 kg (204 lb 12.8 oz)   Height: 5' 5\" (1.651 m)            Physical Exam   Constitutional: She is oriented to person, place, and time.  She appears well-developed and well-nourished. HENT:   Head: Normocephalic and atraumatic. Eyes: EOM are normal. Pupils are equal, round, and reactive to light. Neck: Normal range of motion. Neck supple. No tracheal deviation present. Cardiovascular: Normal rate, regular rhythm, normal heart sounds and intact distal pulses. Pulmonary/Chest: Effort normal and breath sounds normal. No stridor. No respiratory distress. She has no wheezes. She has no rales. She exhibits no tenderness. Abdominal: Soft. Bowel sounds are normal. She exhibits no distension. There is no tenderness. There is no rebound. Musculoskeletal: Normal range of motion. She exhibits no edema or tenderness. Neurological: She is alert and oriented to person, place, and time. No cranial nerve deficit. Skin: Skin is warm and dry. No rash noted. No pallor. Psychiatric: She has a normal mood and affect. Nursing note and vitals reviewed. Note written by Shauna Aggarwal. Cynthia Rincon, as dictated by Derek Garrett MD 9:10 AM       MDM  Number of Diagnoses or Management Options  Constipation, unspecified constipation type:   Diagnosis management comments: 42year-old female with history of fibroids recent with complaints of 4 days right-sided abdominal pain with constipation. Patient is well-appearing, in no acute distress, abdomen soft nontender/nondistended. Plan-KUB, UA, urine pregnancy test, CBC/CMP, pain control, IV fluid hydration. Labs unremarkable  X-ray shows moderate amount of stool       Amount and/or Complexity of Data Reviewed  Clinical lab tests: ordered and reviewed  Tests in the radiology section of CPT®: ordered and reviewed  Independent visualization of images, tracings, or specimens: yes    Risk of Complications, Morbidity, and/or Mortality  Presenting problems: low  Diagnostic procedures: low  Management options: low    Patient Progress  Patient progress: improved    ED Course       Procedures        Patient's results have been reviewed with them. Patient and/or family have verbally conveyed their understanding and agreement of the patient's signs, symptoms, diagnosis, treatment and prognosis and additionally agree to follow up as recommended or return to the Emergency Room should their condition change prior to follow-up. Discharge instructions have also been provided to the patient with some educational information regarding their diagnosis as well a list of reasons why they would want to return to the ER prior to their follow-up appointment should their condition change.

## 2017-09-08 NOTE — ED TRIAGE NOTES
TRIAGE NOTE: Patient reports 4 days of right flank pain that radiates into RLQ and down her right leg. \"It's my fibroids\". Patient reports bloating and constipation. Last BM yesterday. Denies n/v. +urinary frequency. Denies fever.

## 2017-09-08 NOTE — DISCHARGE INSTRUCTIONS
Constipation: Care Instructions  Your Care Instructions  Constipation means that you have a hard time passing stools (bowel movements). People pass stools from 3 times a day to once every 3 days. What is normal for you may be different. Constipation may occur with pain in the rectum and cramping. The pain may get worse when you try to pass stools. Sometimes there are small amounts of bright red blood on toilet paper or the surface of stools. This is because of enlarged veins near the rectum (hemorrhoids). A few changes in your diet and lifestyle may help you avoid ongoing constipation. Your doctor may also prescribe medicine to help loosen your stool. Some medicines can cause constipation. These include pain medicines and antidepressants. Tell your doctor about all the medicines you take. Your doctor may want to make a medicine change to ease your symptoms. Follow-up care is a key part of your treatment and safety. Be sure to make and go to all appointments, and call your doctor if you are having problems. It's also a good idea to know your test results and keep a list of the medicines you take. How can you care for yourself at home? · Drink plenty of fluids, enough so that your urine is light yellow or clear like water. If you have kidney, heart, or liver disease and have to limit fluids, talk with your doctor before you increase the amount of fluids you drink. · Include high-fiber foods in your diet each day. These include fruits, vegetables, beans, and whole grains. · Get at least 30 minutes of exercise on most days of the week. Walking is a good choice. You also may want to do other activities, such as running, swimming, cycling, or playing tennis or team sports. · Take a fiber supplement, such as Citrucel or Metamucil, every day. Read and follow all instructions on the label. · Schedule time each day for a bowel movement. A daily routine may help.  Take your time having your bowel movement. · Support your feet with a small step stool when you sit on the toilet. This helps flex your hips and places your pelvis in a squatting position. · Your doctor may recommend an over-the-counter laxative to relieve your constipation. Examples are Milk of Magnesia and MiraLax. Read and follow all instructions on the label. Do not use laxatives on a long-term basis. When should you call for help? Call your doctor now or seek immediate medical care if:  · You have new or worse belly pain. · You have new or worse nausea or vomiting. · You have blood in your stools. Watch closely for changes in your health, and be sure to contact your doctor if:  · Your constipation is getting worse. · You do not get better as expected. Where can you learn more? Go to http://keely-kacie.info/. Enter 21 [phone] in the search box to learn more about \"Constipation: Care Instructions. \"  Current as of: March 20, 2017  Content Version: 11.3  © 6779-8994 Healthwise, Incorporated. Care instructions adapted under license by [employer] (which disclaims liability or warranty for this information). If you have questions about a medical condition or this instruction, always ask your healthcare professional. [place] [address] any warranty or liability for your use of this information.

## 2017-09-18 DIAGNOSIS — G43.909 MIGRAINE WITHOUT STATUS MIGRAINOSUS, NOT INTRACTABLE, UNSPECIFIED MIGRAINE TYPE: ICD-10-CM

## 2017-09-18 NOTE — TELEPHONE ENCOUNTER
LOV: March 02, 2017    Patient is requesting if she can get a higher dosage of the topamax. She stated her headaches have gotten worse and the meds dont seem to be working.

## 2017-09-29 RX ORDER — TOPIRAMATE 50 MG/1
TABLET, FILM COATED ORAL
Qty: 60 TAB | Refills: 3 | Status: SHIPPED | OUTPATIENT
Start: 2017-09-29 | End: 2017-10-05 | Stop reason: SDUPTHER

## 2017-09-29 NOTE — TELEPHONE ENCOUNTER
[facility] is requesting a 90 day supply for Totipalmate 50 mg.     LOV 3/2/17  NO FUTURE APPT  [facility]'s # [phone]   FAX # [phone]

## 2017-10-04 ENCOUNTER — HOSPITAL ENCOUNTER (OUTPATIENT)
Dept: PREADMISSION TESTING | Age: 37
Discharge: HOME | End: 2017-10-04
Payer: COMMERCIAL

## 2017-10-04 VITALS
DIASTOLIC BLOOD PRESSURE: 83 MMHG | HEIGHT: 66 IN | SYSTOLIC BLOOD PRESSURE: 120 MMHG | WEIGHT: 208.38 LBS | TEMPERATURE: 98.2 F | RESPIRATION RATE: 18 BRPM | OXYGEN SATURATION: 100 % | BODY MASS INDEX: 33.49 KG/M2

## 2017-10-04 LAB
APPEARANCE UR: CLEAR
BACTERIA URNS QL MICRO: NEGATIVE /HPF
BILIRUB UR QL: NEGATIVE
COLOR UR: NORMAL
EPITH CASTS URNS QL MICRO: NORMAL /LPF
GLUCOSE UR STRIP.AUTO-MCNC: NEGATIVE MG/DL
HCG SERPL-ACNC: <1 MIU/ML (ref 0–6)
HGB UR QL STRIP: NEGATIVE
HYALINE CASTS URNS QL MICRO: NORMAL /LPF (ref 0–5)
KETONES UR QL STRIP.AUTO: NEGATIVE MG/DL
LEUKOCYTE ESTERASE UR QL STRIP.AUTO: NEGATIVE
NITRITE UR QL STRIP.AUTO: NEGATIVE
PH UR STRIP: 5.5 [PH] (ref 5–8)
PROT UR STRIP-MCNC: NEGATIVE MG/DL
RBC #/AREA URNS HPF: NORMAL /HPF (ref 0–5)
SP GR UR REFRACTOMETRY: 1.02 (ref 1–1.03)
UA: UC IF INDICATED,UAUC: NORMAL
UROBILINOGEN UR QL STRIP.AUTO: 0.2 EU/DL (ref 0.2–1)
WBC URNS QL MICRO: NORMAL /HPF (ref 0–4)

## 2017-10-04 PROCEDURE — 84702 CHORIONIC GONADOTROPIN TEST: CPT | Performed by: OBSTETRICS & GYNECOLOGY

## 2017-10-04 PROCEDURE — 36415 COLL VENOUS BLD VENIPUNCTURE: CPT | Performed by: OBSTETRICS & GYNECOLOGY

## 2017-10-04 PROCEDURE — 86850 RBC ANTIBODY SCREEN: CPT | Performed by: OBSTETRICS & GYNECOLOGY

## 2017-10-04 PROCEDURE — 81001 URINALYSIS AUTO W/SCOPE: CPT | Performed by: OBSTETRICS & GYNECOLOGY

## 2017-10-04 RX ORDER — IBUPROFEN 800 MG/1
800 TABLET ORAL
COMMUNITY
End: 2017-12-02

## 2017-10-04 RX ORDER — TRAMADOL HYDROCHLORIDE 50 MG/1
50 TABLET ORAL
Status: ON HOLD | COMMUNITY
End: 2017-10-11

## 2017-10-05 DIAGNOSIS — G43.909 MIGRAINE WITHOUT STATUS MIGRAINOSUS, NOT INTRACTABLE, UNSPECIFIED MIGRAINE TYPE: ICD-10-CM

## 2017-10-05 LAB
ABO + RH BLD: NORMAL
BLOOD GROUP ANTIBODIES SERPL: NORMAL
SPECIMEN EXP DATE BLD: NORMAL

## 2017-10-06 RX ORDER — TOPIRAMATE 50 MG/1
TABLET, FILM COATED ORAL
Qty: 180 TAB | Refills: 0 | Status: SHIPPED | OUTPATIENT
Start: 2017-10-06 | End: 2018-02-27 | Stop reason: SDUPTHER

## 2017-10-10 ENCOUNTER — ANESTHESIA EVENT (OUTPATIENT)
Dept: SURGERY | Age: 37
End: 2017-10-10
Payer: COMMERCIAL

## 2017-10-11 ENCOUNTER — HOSPITAL ENCOUNTER (OUTPATIENT)
Age: 37
Setting detail: OBSERVATION
Discharge: HOME | End: 2017-10-12
Attending: OBSTETRICS & GYNECOLOGY | Admitting: OBSTETRICS & GYNECOLOGY
Payer: COMMERCIAL

## 2017-10-11 ENCOUNTER — ANESTHESIA (OUTPATIENT)
Dept: SURGERY | Age: 37
End: 2017-10-11
Payer: COMMERCIAL

## 2017-10-11 PROBLEM — D21.9 FIBROIDS: Status: ACTIVE | Noted: 2017-10-11

## 2017-10-11 LAB — HCG UR QL: NEGATIVE

## 2017-10-11 PROCEDURE — 77030035277 HC OBTRTR BLDELSS DISP INTU -B: Performed by: OBSTETRICS & GYNECOLOGY

## 2017-10-11 PROCEDURE — 77030003666 HC NDL SPINAL BD -A: Performed by: OBSTETRICS & GYNECOLOGY

## 2017-10-11 PROCEDURE — 77030010507 HC ADH SKN DERMBND J&J -B: Performed by: OBSTETRICS & GYNECOLOGY

## 2017-10-11 PROCEDURE — 77030008771 HC TU NG SALEM SUMP -A: Performed by: ANESTHESIOLOGY

## 2017-10-11 PROCEDURE — 77030008557 HC TBNG SMK EVAC STOR -B: Performed by: OBSTETRICS & GYNECOLOGY

## 2017-10-11 PROCEDURE — 77030008756 HC TU IRR SUC STRY -B: Performed by: OBSTETRICS & GYNECOLOGY

## 2017-10-11 PROCEDURE — 77030020782 HC GWN BAIR PAWS FLX 3M -B

## 2017-10-11 PROCEDURE — 74011000250 HC RX REV CODE- 250: Performed by: OBSTETRICS & GYNECOLOGY

## 2017-10-11 PROCEDURE — 74011250636 HC RX REV CODE- 250/636

## 2017-10-11 PROCEDURE — 77030034850: Performed by: OBSTETRICS & GYNECOLOGY

## 2017-10-11 PROCEDURE — 76060000036 HC ANESTHESIA 2.5 TO 3 HR: Performed by: OBSTETRICS & GYNECOLOGY

## 2017-10-11 PROCEDURE — C1765 ADHESION BARRIER: HCPCS | Performed by: OBSTETRICS & GYNECOLOGY

## 2017-10-11 PROCEDURE — 77030014647 HC SEAL FBRN TISSL BAXT -D: Performed by: OBSTETRICS & GYNECOLOGY

## 2017-10-11 PROCEDURE — 74011250636 HC RX REV CODE- 250/636: Performed by: ANESTHESIOLOGY

## 2017-10-11 PROCEDURE — 77030003580 HC NDL INSUF VERES J&J -B: Performed by: OBSTETRICS & GYNECOLOGY

## 2017-10-11 PROCEDURE — 77030011640 HC PAD GRND REM COVD -A: Performed by: OBSTETRICS & GYNECOLOGY

## 2017-10-11 PROCEDURE — 77030002933 HC SUT MCRYL J&J -A: Performed by: OBSTETRICS & GYNECOLOGY

## 2017-10-11 PROCEDURE — 77030032490 HC SLV COMPR SCD KNE COVD -B: Performed by: OBSTETRICS & GYNECOLOGY

## 2017-10-11 PROCEDURE — 77030013079 HC BLNKT BAIR HGGR 3M -A: Performed by: ANESTHESIOLOGY

## 2017-10-11 PROCEDURE — 77030019927 HC TBNG IRR CYSTO BAXT -A: Performed by: OBSTETRICS & GYNECOLOGY

## 2017-10-11 PROCEDURE — 77030019908 HC STETH ESOPH SIMS -A: Performed by: ANESTHESIOLOGY

## 2017-10-11 PROCEDURE — 76010000877 HC OR TIME 2.5 TO 3HR INTENSV - TIER 2: Performed by: OBSTETRICS & GYNECOLOGY

## 2017-10-11 PROCEDURE — 77030018836 HC SOL IRR NACL ICUM -A: Performed by: OBSTETRICS & GYNECOLOGY

## 2017-10-11 PROCEDURE — 74011250637 HC RX REV CODE- 250/637: Performed by: OBSTETRICS & GYNECOLOGY

## 2017-10-11 PROCEDURE — 74011250636 HC RX REV CODE- 250/636: Performed by: OBSTETRICS & GYNECOLOGY

## 2017-10-11 PROCEDURE — 99218 HC RM OBSERVATION: CPT

## 2017-10-11 PROCEDURE — 77030035045 HC TRCR ENDOSC VRSPRT BLDLSS COVD -B: Performed by: OBSTETRICS & GYNECOLOGY

## 2017-10-11 PROCEDURE — 74011258636 HC RX REV CODE- 258/636: Performed by: OBSTETRICS & GYNECOLOGY

## 2017-10-11 PROCEDURE — 81025 URINE PREGNANCY TEST: CPT

## 2017-10-11 PROCEDURE — 88307 TISSUE EXAM BY PATHOLOGIST: CPT | Performed by: OBSTETRICS & GYNECOLOGY

## 2017-10-11 PROCEDURE — 77030014007 HC SPNG HEMSTAT J&J -B: Performed by: OBSTETRICS & GYNECOLOGY

## 2017-10-11 PROCEDURE — 77030036554

## 2017-10-11 PROCEDURE — 77030016151 HC PROTCTR LNS DFOG COVD -B: Performed by: OBSTETRICS & GYNECOLOGY

## 2017-10-11 PROCEDURE — 77030008684 HC TU ET CUF COVD -B: Performed by: ANESTHESIOLOGY

## 2017-10-11 PROCEDURE — 76210000017 HC OR PH I REC 1.5 TO 2 HR: Performed by: OBSTETRICS & GYNECOLOGY

## 2017-10-11 PROCEDURE — 77030008603 HC TRCR ENDOSC EPATH J&J -C: Performed by: OBSTETRICS & GYNECOLOGY

## 2017-10-11 PROCEDURE — 77030010517 HC APPL SEAL FLOSEL BAXT -B: Performed by: OBSTETRICS & GYNECOLOGY

## 2017-10-11 PROCEDURE — 74011000250 HC RX REV CODE- 250

## 2017-10-11 PROCEDURE — 77030033067 HC SUT PDO STRATFX SPIR J&J -B: Performed by: OBSTETRICS & GYNECOLOGY

## 2017-10-11 RX ORDER — FENTANYL CITRATE 50 UG/ML
25 INJECTION, SOLUTION INTRAMUSCULAR; INTRAVENOUS
Status: COMPLETED | OUTPATIENT
Start: 2017-10-11 | End: 2017-10-11

## 2017-10-11 RX ORDER — GLYCOPYRROLATE 0.2 MG/ML
INJECTION INTRAMUSCULAR; INTRAVENOUS AS NEEDED
Status: DISCONTINUED | OUTPATIENT
Start: 2017-10-11 | End: 2017-10-11 | Stop reason: HOSPADM

## 2017-10-11 RX ORDER — SODIUM CHLORIDE, SODIUM LACTATE, POTASSIUM CHLORIDE, CALCIUM CHLORIDE 600; 310; 30; 20 MG/100ML; MG/100ML; MG/100ML; MG/100ML
25 INJECTION, SOLUTION INTRAVENOUS CONTINUOUS
Status: DISCONTINUED | OUTPATIENT
Start: 2017-10-11 | End: 2017-10-11 | Stop reason: HOSPADM

## 2017-10-11 RX ORDER — LIDOCAINE HYDROCHLORIDE 10 MG/ML
0.1 INJECTION, SOLUTION EPIDURAL; INFILTRATION; INTRACAUDAL; PERINEURAL AS NEEDED
Status: DISCONTINUED | OUTPATIENT
Start: 2017-10-11 | End: 2017-10-11 | Stop reason: HOSPADM

## 2017-10-11 RX ORDER — ZOLPIDEM TARTRATE 5 MG/1
5 TABLET ORAL
Status: DISCONTINUED | OUTPATIENT
Start: 2017-10-11 | End: 2017-10-12 | Stop reason: HOSPADM

## 2017-10-11 RX ORDER — MIDAZOLAM HYDROCHLORIDE 1 MG/ML
1 INJECTION, SOLUTION INTRAMUSCULAR; INTRAVENOUS AS NEEDED
Status: DISCONTINUED | OUTPATIENT
Start: 2017-10-11 | End: 2017-10-11 | Stop reason: HOSPADM

## 2017-10-11 RX ORDER — SUCCINYLCHOLINE CHLORIDE 20 MG/ML
INJECTION INTRAMUSCULAR; INTRAVENOUS AS NEEDED
Status: DISCONTINUED | OUTPATIENT
Start: 2017-10-11 | End: 2017-10-11 | Stop reason: HOSPADM

## 2017-10-11 RX ORDER — PROPOFOL 10 MG/ML
INJECTION, EMULSION INTRAVENOUS AS NEEDED
Status: DISCONTINUED | OUTPATIENT
Start: 2017-10-11 | End: 2017-10-11 | Stop reason: HOSPADM

## 2017-10-11 RX ORDER — ROPIVACAINE HYDROCHLORIDE 5 MG/ML
30 INJECTION, SOLUTION EPIDURAL; INFILTRATION; PERINEURAL AS NEEDED
Status: DISCONTINUED | OUTPATIENT
Start: 2017-10-11 | End: 2017-10-11 | Stop reason: HOSPADM

## 2017-10-11 RX ORDER — SODIUM CHLORIDE 0.9 % (FLUSH) 0.9 %
5-10 SYRINGE (ML) INJECTION AS NEEDED
Status: DISCONTINUED | OUTPATIENT
Start: 2017-10-11 | End: 2017-10-11 | Stop reason: HOSPADM

## 2017-10-11 RX ORDER — HYDROMORPHONE HYDROCHLORIDE 1 MG/ML
INJECTION, SOLUTION INTRAMUSCULAR; INTRAVENOUS; SUBCUTANEOUS AS NEEDED
Status: DISCONTINUED | OUTPATIENT
Start: 2017-10-11 | End: 2017-10-11 | Stop reason: HOSPADM

## 2017-10-11 RX ORDER — SODIUM CHLORIDE 0.9 % (FLUSH) 0.9 %
5-10 SYRINGE (ML) INJECTION EVERY 8 HOURS
Status: DISCONTINUED | OUTPATIENT
Start: 2017-10-11 | End: 2017-10-12 | Stop reason: HOSPADM

## 2017-10-11 RX ORDER — FENTANYL CITRATE 50 UG/ML
50 INJECTION, SOLUTION INTRAMUSCULAR; INTRAVENOUS AS NEEDED
Status: DISCONTINUED | OUTPATIENT
Start: 2017-10-11 | End: 2017-10-11 | Stop reason: HOSPADM

## 2017-10-11 RX ORDER — OXYCODONE AND ACETAMINOPHEN 5; 325 MG/1; MG/1
2 TABLET ORAL
Status: DISCONTINUED | OUTPATIENT
Start: 2017-10-11 | End: 2017-10-12 | Stop reason: HOSPADM

## 2017-10-11 RX ORDER — HYDROMORPHONE HYDROCHLORIDE 1 MG/ML
0.2 INJECTION, SOLUTION INTRAMUSCULAR; INTRAVENOUS; SUBCUTANEOUS
Status: DISCONTINUED | OUTPATIENT
Start: 2017-10-11 | End: 2017-10-11 | Stop reason: HOSPADM

## 2017-10-11 RX ORDER — CLINDAMYCIN PHOSPHATE 900 MG/50ML
900 INJECTION INTRAVENOUS ONCE
Status: COMPLETED | OUTPATIENT
Start: 2017-10-11 | End: 2017-10-11

## 2017-10-11 RX ORDER — DEXAMETHASONE SODIUM PHOSPHATE 4 MG/ML
INJECTION, SOLUTION INTRA-ARTICULAR; INTRALESIONAL; INTRAMUSCULAR; INTRAVENOUS; SOFT TISSUE AS NEEDED
Status: DISCONTINUED | OUTPATIENT
Start: 2017-10-11 | End: 2017-10-11 | Stop reason: HOSPADM

## 2017-10-11 RX ORDER — MORPHINE SULFATE 10 MG/ML
2 INJECTION, SOLUTION INTRAMUSCULAR; INTRAVENOUS
Status: DISCONTINUED | OUTPATIENT
Start: 2017-10-11 | End: 2017-10-11 | Stop reason: HOSPADM

## 2017-10-11 RX ORDER — SODIUM CHLORIDE 0.9 % (FLUSH) 0.9 %
5-10 SYRINGE (ML) INJECTION EVERY 8 HOURS
Status: DISCONTINUED | OUTPATIENT
Start: 2017-10-11 | End: 2017-10-11 | Stop reason: HOSPADM

## 2017-10-11 RX ORDER — POLYETHYLENE GLYCOL 3350 17 G/17G
17 POWDER, FOR SOLUTION ORAL DAILY
Status: DISCONTINUED | OUTPATIENT
Start: 2017-10-12 | End: 2017-10-12 | Stop reason: HOSPADM

## 2017-10-11 RX ORDER — KETOROLAC TROMETHAMINE 30 MG/ML
30 INJECTION, SOLUTION INTRAMUSCULAR; INTRAVENOUS
Status: DISCONTINUED | OUTPATIENT
Start: 2017-10-11 | End: 2017-10-12 | Stop reason: HOSPADM

## 2017-10-11 RX ORDER — PHENYLEPHRINE HCL IN 0.9% NACL 0.4MG/10ML
SYRINGE (ML) INTRAVENOUS AS NEEDED
Status: DISCONTINUED | OUTPATIENT
Start: 2017-10-11 | End: 2017-10-11 | Stop reason: HOSPADM

## 2017-10-11 RX ORDER — FACIAL-BODY WIPES
10 EACH TOPICAL DAILY
Status: DISCONTINUED | OUTPATIENT
Start: 2017-10-12 | End: 2017-10-12 | Stop reason: HOSPADM

## 2017-10-11 RX ORDER — SODIUM CHLORIDE, SODIUM LACTATE, POTASSIUM CHLORIDE, CALCIUM CHLORIDE 600; 310; 30; 20 MG/100ML; MG/100ML; MG/100ML; MG/100ML
INJECTION, SOLUTION INTRAVENOUS
Status: DISCONTINUED | OUTPATIENT
Start: 2017-10-11 | End: 2017-10-11 | Stop reason: HOSPADM

## 2017-10-11 RX ORDER — ONDANSETRON 2 MG/ML
INJECTION INTRAMUSCULAR; INTRAVENOUS AS NEEDED
Status: DISCONTINUED | OUTPATIENT
Start: 2017-10-11 | End: 2017-10-11 | Stop reason: HOSPADM

## 2017-10-11 RX ORDER — SODIUM CHLORIDE 9 MG/ML
25 INJECTION, SOLUTION INTRAVENOUS CONTINUOUS
Status: DISCONTINUED | OUTPATIENT
Start: 2017-10-11 | End: 2017-10-11 | Stop reason: HOSPADM

## 2017-10-11 RX ORDER — DEXTROSE, SODIUM CHLORIDE, SODIUM LACTATE, POTASSIUM CHLORIDE, AND CALCIUM CHLORIDE 5; .6; .31; .03; .02 G/100ML; G/100ML; G/100ML; G/100ML; G/100ML
150 INJECTION, SOLUTION INTRAVENOUS CONTINUOUS
Status: DISCONTINUED | OUTPATIENT
Start: 2017-10-11 | End: 2017-10-12 | Stop reason: HOSPADM

## 2017-10-11 RX ORDER — OXYCODONE HYDROCHLORIDE 5 MG/1
5 TABLET ORAL AS NEEDED
Status: DISCONTINUED | OUTPATIENT
Start: 2017-10-11 | End: 2017-10-11 | Stop reason: HOSPADM

## 2017-10-11 RX ORDER — SODIUM CHLORIDE 0.9 % (FLUSH) 0.9 %
5-10 SYRINGE (ML) INJECTION AS NEEDED
Status: DISCONTINUED | OUTPATIENT
Start: 2017-10-11 | End: 2017-10-12 | Stop reason: HOSPADM

## 2017-10-11 RX ORDER — BUPIVACAINE HYDROCHLORIDE AND EPINEPHRINE 2.5; 5 MG/ML; UG/ML
INJECTION, SOLUTION EPIDURAL; INFILTRATION; INTRACAUDAL; PERINEURAL AS NEEDED
Status: DISCONTINUED | OUTPATIENT
Start: 2017-10-11 | End: 2017-10-11 | Stop reason: HOSPADM

## 2017-10-11 RX ORDER — DIPHENHYDRAMINE HYDROCHLORIDE 50 MG/ML
12.5 INJECTION, SOLUTION INTRAMUSCULAR; INTRAVENOUS AS NEEDED
Status: DISCONTINUED | OUTPATIENT
Start: 2017-10-11 | End: 2017-10-11 | Stop reason: HOSPADM

## 2017-10-11 RX ORDER — KETOROLAC TROMETHAMINE 30 MG/ML
INJECTION, SOLUTION INTRAMUSCULAR; INTRAVENOUS AS NEEDED
Status: DISCONTINUED | OUTPATIENT
Start: 2017-10-11 | End: 2017-10-11 | Stop reason: HOSPADM

## 2017-10-11 RX ORDER — HYDROCODONE BITARTRATE AND ACETAMINOPHEN 5; 325 MG/1; MG/1
2 TABLET ORAL
Status: ON HOLD | COMMUNITY
End: 2017-10-11

## 2017-10-11 RX ORDER — FENTANYL CITRATE 50 UG/ML
INJECTION, SOLUTION INTRAMUSCULAR; INTRAVENOUS AS NEEDED
Status: DISCONTINUED | OUTPATIENT
Start: 2017-10-11 | End: 2017-10-11 | Stop reason: HOSPADM

## 2017-10-11 RX ORDER — ROCURONIUM BROMIDE 10 MG/ML
INJECTION, SOLUTION INTRAVENOUS AS NEEDED
Status: DISCONTINUED | OUTPATIENT
Start: 2017-10-11 | End: 2017-10-11 | Stop reason: HOSPADM

## 2017-10-11 RX ORDER — MIDAZOLAM HYDROCHLORIDE 1 MG/ML
0.5 INJECTION, SOLUTION INTRAMUSCULAR; INTRAVENOUS
Status: DISCONTINUED | OUTPATIENT
Start: 2017-10-11 | End: 2017-10-11 | Stop reason: HOSPADM

## 2017-10-11 RX ORDER — NEOSTIGMINE METHYLSULFATE 1 MG/ML
INJECTION INTRAVENOUS AS NEEDED
Status: DISCONTINUED | OUTPATIENT
Start: 2017-10-11 | End: 2017-10-11 | Stop reason: HOSPADM

## 2017-10-11 RX ORDER — IBUPROFEN 400 MG/1
800 TABLET ORAL
Status: DISCONTINUED | OUTPATIENT
Start: 2017-10-11 | End: 2017-10-12 | Stop reason: HOSPADM

## 2017-10-11 RX ORDER — ONDANSETRON 2 MG/ML
4 INJECTION INTRAMUSCULAR; INTRAVENOUS AS NEEDED
Status: DISCONTINUED | OUTPATIENT
Start: 2017-10-11 | End: 2017-10-11 | Stop reason: HOSPADM

## 2017-10-11 RX ORDER — OXYCODONE AND ACETAMINOPHEN 5; 325 MG/1; MG/1
1 TABLET ORAL
Status: DISCONTINUED | OUTPATIENT
Start: 2017-10-11 | End: 2017-10-12 | Stop reason: HOSPADM

## 2017-10-11 RX ORDER — ENOXAPARIN SODIUM 100 MG/ML
40 INJECTION SUBCUTANEOUS EVERY 24 HOURS
Status: DISCONTINUED | OUTPATIENT
Start: 2017-10-11 | End: 2017-10-12 | Stop reason: HOSPADM

## 2017-10-11 RX ORDER — TOPIRAMATE 25 MG/1
50 TABLET ORAL 2 TIMES DAILY
Status: DISCONTINUED | OUTPATIENT
Start: 2017-10-11 | End: 2017-10-12 | Stop reason: HOSPADM

## 2017-10-11 RX ORDER — LIDOCAINE HYDROCHLORIDE 20 MG/ML
INJECTION, SOLUTION EPIDURAL; INFILTRATION; INTRACAUDAL; PERINEURAL AS NEEDED
Status: DISCONTINUED | OUTPATIENT
Start: 2017-10-11 | End: 2017-10-11 | Stop reason: HOSPADM

## 2017-10-11 RX ORDER — METHYLERGONOVINE MALEATE 0.2 MG/ML
INJECTION INTRAVENOUS AS NEEDED
Status: DISCONTINUED | OUTPATIENT
Start: 2017-10-11 | End: 2017-10-11 | Stop reason: HOSPADM

## 2017-10-11 RX ORDER — SODIUM CHLORIDE, SODIUM LACTATE, POTASSIUM CHLORIDE, CALCIUM CHLORIDE 600; 310; 30; 20 MG/100ML; MG/100ML; MG/100ML; MG/100ML
100 INJECTION, SOLUTION INTRAVENOUS CONTINUOUS
Status: DISCONTINUED | OUTPATIENT
Start: 2017-10-11 | End: 2017-10-11 | Stop reason: HOSPADM

## 2017-10-11 RX ADMIN — OXYCODONE HYDROCHLORIDE AND ACETAMINOPHEN 2 TABLET: 5; 325 TABLET ORAL at 15:29

## 2017-10-11 RX ADMIN — ROCURONIUM BROMIDE 5 MG: 10 INJECTION, SOLUTION INTRAVENOUS at 09:05

## 2017-10-11 RX ADMIN — FENTANYL CITRATE 25 MCG: 50 INJECTION, SOLUTION INTRAMUSCULAR; INTRAVENOUS at 12:30

## 2017-10-11 RX ADMIN — DEXAMETHASONE SODIUM PHOSPHATE 8 MG: 4 INJECTION, SOLUTION INTRA-ARTICULAR; INTRALESIONAL; INTRAMUSCULAR; INTRAVENOUS; SOFT TISSUE at 09:13

## 2017-10-11 RX ADMIN — KETOROLAC TROMETHAMINE 30 MG: 30 INJECTION, SOLUTION INTRAMUSCULAR at 14:03

## 2017-10-11 RX ADMIN — ROCURONIUM BROMIDE 30 MG: 10 INJECTION, SOLUTION INTRAVENOUS at 10:05

## 2017-10-11 RX ADMIN — MEPERIDINE HYDROCHLORIDE 12.5 MG: 25 INJECTION INTRAMUSCULAR; INTRAVENOUS; SUBCUTANEOUS at 12:00

## 2017-10-11 RX ADMIN — FENTANYL CITRATE 50 MCG: 50 INJECTION, SOLUTION INTRAMUSCULAR; INTRAVENOUS at 11:51

## 2017-10-11 RX ADMIN — KETOROLAC TROMETHAMINE 30 MG: 30 INJECTION, SOLUTION INTRAMUSCULAR; INTRAVENOUS at 11:30

## 2017-10-11 RX ADMIN — ENOXAPARIN SODIUM 40 MG: 40 INJECTION SUBCUTANEOUS at 17:38

## 2017-10-11 RX ADMIN — Medication 40 MCG: at 09:57

## 2017-10-11 RX ADMIN — SODIUM CHLORIDE, SODIUM LACTATE, POTASSIUM CHLORIDE, AND CALCIUM CHLORIDE 25 ML/HR: 600; 310; 30; 20 INJECTION, SOLUTION INTRAVENOUS at 07:49

## 2017-10-11 RX ADMIN — CLINDAMYCIN PHOSPHATE 900 MG: 18 INJECTION, SOLUTION INTRAVENOUS at 09:21

## 2017-10-11 RX ADMIN — FENTANYL CITRATE 50 MCG: 50 INJECTION, SOLUTION INTRAMUSCULAR; INTRAVENOUS at 11:31

## 2017-10-11 RX ADMIN — FENTANYL CITRATE 100 MCG: 50 INJECTION, SOLUTION INTRAMUSCULAR; INTRAVENOUS at 09:05

## 2017-10-11 RX ADMIN — GLYCOPYRROLATE 0.4 MG: 0.2 INJECTION INTRAMUSCULAR; INTRAVENOUS at 11:28

## 2017-10-11 RX ADMIN — Medication 10 ML: at 14:04

## 2017-10-11 RX ADMIN — OXYCODONE HYDROCHLORIDE AND ACETAMINOPHEN 2 TABLET: 5; 325 TABLET ORAL at 23:27

## 2017-10-11 RX ADMIN — ZOLPIDEM TARTRATE 5 MG: 5 TABLET ORAL at 21:35

## 2017-10-11 RX ADMIN — HYDROMORPHONE HYDROCHLORIDE 0.5 MG: 1 INJECTION, SOLUTION INTRAMUSCULAR; INTRAVENOUS; SUBCUTANEOUS at 11:26

## 2017-10-11 RX ADMIN — SUCCINYLCHOLINE CHLORIDE 140 MG: 20 INJECTION INTRAMUSCULAR; INTRAVENOUS at 09:05

## 2017-10-11 RX ADMIN — LIDOCAINE HYDROCHLORIDE 80 MG: 20 INJECTION, SOLUTION EPIDURAL; INFILTRATION; INTRACAUDAL; PERINEURAL at 09:05

## 2017-10-11 RX ADMIN — SODIUM CHLORIDE, SODIUM LACTATE, POTASSIUM CHLORIDE, CALCIUM CHLORIDE: 600; 310; 30; 20 INJECTION, SOLUTION INTRAVENOUS at 09:08

## 2017-10-11 RX ADMIN — SODIUM CHLORIDE, SODIUM LACTATE, POTASSIUM CHLORIDE, CALCIUM CHLORIDE: 600; 310; 30; 20 INJECTION, SOLUTION INTRAVENOUS at 08:54

## 2017-10-11 RX ADMIN — OXYCODONE HYDROCHLORIDE AND ACETAMINOPHEN 2 TABLET: 5; 325 TABLET ORAL at 19:25

## 2017-10-11 RX ADMIN — HYDROMORPHONE HYDROCHLORIDE 0.5 MG: 1 INJECTION, SOLUTION INTRAMUSCULAR; INTRAVENOUS; SUBCUTANEOUS at 10:46

## 2017-10-11 RX ADMIN — NEOSTIGMINE METHYLSULFATE 3 MG: 1 INJECTION INTRAVENOUS at 11:28

## 2017-10-11 RX ADMIN — PROPOFOL 200 MG: 10 INJECTION, EMULSION INTRAVENOUS at 09:05

## 2017-10-11 RX ADMIN — FENTANYL CITRATE 25 MCG: 50 INJECTION, SOLUTION INTRAMUSCULAR; INTRAVENOUS at 12:05

## 2017-10-11 RX ADMIN — SODIUM CHLORIDE, SODIUM LACTATE, POTASSIUM CHLORIDE, CALCIUM CHLORIDE, AND DEXTROSE MONOHYDRATE 150 ML/HR: 600; 310; 30; 20; 5 INJECTION, SOLUTION INTRAVENOUS at 12:36

## 2017-10-11 RX ADMIN — ONDANSETRON 4 MG: 2 INJECTION INTRAMUSCULAR; INTRAVENOUS at 09:13

## 2017-10-11 RX ADMIN — ROCURONIUM BROMIDE 45 MG: 10 INJECTION, SOLUTION INTRAVENOUS at 09:08

## 2017-10-11 RX ADMIN — FENTANYL CITRATE 25 MCG: 50 INJECTION, SOLUTION INTRAMUSCULAR; INTRAVENOUS at 12:25

## 2017-10-11 RX ADMIN — FENTANYL CITRATE 25 MCG: 50 INJECTION, SOLUTION INTRAMUSCULAR; INTRAVENOUS at 12:15

## 2017-10-11 RX ADMIN — SODIUM CHLORIDE, SODIUM LACTATE, POTASSIUM CHLORIDE, CALCIUM CHLORIDE: 600; 310; 30; 20 INJECTION, SOLUTION INTRAVENOUS at 11:27

## 2017-10-11 RX ADMIN — FENTANYL CITRATE 50 MCG: 50 INJECTION, SOLUTION INTRAMUSCULAR; INTRAVENOUS at 11:30

## 2017-10-11 RX ADMIN — Medication 40 MCG: at 10:57

## 2017-10-11 RX ADMIN — METHYLERGONOVINE MALEATE 25 MG: 0.2 INJECTION INTRAVENOUS at 11:06

## 2017-10-11 RX ADMIN — TOPIRAMATE 50 MG: 25 TABLET, FILM COATED ORAL at 17:38

## 2017-10-11 NOTE — IP AVS SNAPSHOT
[address] 
[phone] Patient: Lu Latham MRN: [id number] [id number] You are allergic to the following Allergen Reactions Penicillin G Rash Immunizations Administered for This Admission Name Date Influenza Vaccine (Quad) PF  Deferred () Recent Documentation Height Weight Breastfeeding? BMI OB Status Smoking Status 1.664 m 94.5 kg No 34.15 kg/m2 Having regular periods Current Every Day Smoker Emergency Contacts Name Discharge Info Relation Home Work Mobile [address] CAREGIVER [3] Boyfriend [17]   [phone] Molly Humphries  Other Relative [6] [phone] About your hospitalization You were admitted on:  October 11, 2017 You last received care in the:  [address] You were discharged on:  October 12, 2017 Unit phone number:  [phone] Why you were hospitalized Your primary diagnosis was:  Not on File Your diagnoses also included:  Fibroids Providers Seen During Your Hospitalizations Provider Role Specialty Primary office phone Beatrice Moy MD Attending Provider Obstetrics & Gynecology [phone] Your Primary Care Physician (PCP) Primary Care Physician Office Phone Office Fax Nils Fuentes [phone] Follow-up Information Follow up With Details Comments Contact Info Randolph Patel NP   [address] [facility] [place] [address] 
[phone] Current Discharge Medication List  
  
START taking these medications Dose & Instructions Dispensing Information Comments Morning Noon Evening Bedtime  
 docusate sodium 100 mg capsule Commonly known as:  Boy Wilkins Your last dose was:     
   
Your next dose is:    
   
   
 Dose:  100 mg  
 Take 1 Cap by mouth two (2) times daily as needed for Constipation for up to 90 days. Quantity:  60 Cap Refills:  2  
     
   
   
   
  
 oxyCODONE-acetaminophen 5-325 mg per tablet Commonly known as:  PERCOCET Your last dose was: Your next dose is:    
   
   
 Dose:  1 Tab Take 1 Tab by mouth every four (4) hours as needed. Max Daily Amount: 6 Tabs. Quantity:  30 Tab Refills:  0 CONTINUE these medications which have CHANGED Dose & Instructions Dispensing Information Comments Morning Noon Evening Bedtime * ibuprofen 800 mg tablet Commonly known as:  MOTRIN What changed:  Another medication with the same name was added. Make sure you understand how and when to take each. Your last dose was: Your next dose is:    
   
   
 Dose:  800 mg Take 800 mg by mouth every six (6) hours as needed for Pain. Refills:  0  
     
   
   
   
  
 * ibuprofen 800 mg tablet Commonly known as:  MOTRIN What changed: You were already taking a medication with the same name, and this prescription was added. Make sure you understand how and when to take each. Your last dose was: Your next dose is:    
   
   
 Dose:  800 mg Take 1 Tab by mouth every eight (8) hours as needed for Pain for up to 360 days. Quantity:  90 Tab Refills:  0  
     
   
   
   
  
 * Notice: This list has 2 medication(s) that are the same as other medications prescribed for you. Read the directions carefully, and ask your doctor or other care provider to review them with you. CONTINUE these medications which have NOT CHANGED Dose & Instructions Dispensing Information Comments Morning Noon Evening Bedtime MULTIPLE VITAMINS WITH IRON chewable tablet Generic drug:  multivitamin with iron Your last dose was: Your next dose is:    
   
   
 Dose:  1 Tab Take 1 Tab by mouth daily. Refills:  0 polyethylene glycol 17 gram/dose powder Commonly known as:  Gabrielle Sethi Your last dose was: Your next dose is:    
   
   
 Dose:  17 g Take 17 g by mouth daily. 1 tablespoon with 8 oz of water daily Quantity:  238 g Refills:  0  
     
   
   
   
  
 topiramate 50 mg tablet Commonly known as:  TOPAMAX Your last dose was: Your next dose is: TAKE 1 TABLET BY MOUTH TWICE A DAY Quantity:  180 Tab Refills:  0 ASK your doctor about these medications Dose & Instructions Dispensing Information Comments Morning Noon Evening Bedtime HYDROcodone-acetaminophen 5-325 mg per tablet Commonly known as:  Lenon Gauze Your last dose was: Your next dose is:    
   
   
 Dose:  2 Tab Take 2 Tabs by mouth every four (4) hours as needed for Pain. Refills:  0 SLOW FE PO Your last dose was: Your next dose is:    
   
   
 Dose:  1 Tab Take 1 Tab by mouth daily. Refills:  0  
     
   
   
   
  
 traMADol 50 mg tablet Commonly known as:  ULTRAM  
   
Your last dose was: Your next dose is:    
   
   
 Dose:  50 mg Take 50 mg by mouth every six (6) hours as needed for Pain. Refills:  0 Where to Get Your Medications Information on where to get these meds will be given to you by the nurse or doctor. ! Ask your nurse or doctor about these medications  
  docusate sodium 100 mg capsule  
 ibuprofen 800 mg tablet  
 oxyCODONE-acetaminophen 5-325 mg per tablet Discharge Instructions Laparoscopically Assisted Vaginal Hysterectomy: What to Expect at [facility] Your Recovery You can expect to feel better and stronger each day, although you may need pain medicine for a week or two. You may get tired easily or have less energy than usual. This may last for several weeks after surgery.  You will probably notice that your belly is swollen and puffy. This is common. The swelling will take several weeks to go down. It may take about 4 to 6 weeks to fully recover. The recovery time may be less for some patients. You may have some light vaginal bleeding. Don't have sex until the doctor says it is okay. Don't douche or put anything into your vagina, such as a tampon, until your doctor says it is okay. It is important to avoid lifting while you are recovering so that you can heal. 
This care sheet gives you a general idea about how long it will take for you to recover. But each person recovers at a different pace. Follow the steps below to get better as quickly as possible. How can you care for yourself at home? Activity · Rest when you feel tired. Getting enough sleep will help you recover. · Try to walk each day. Start by walking a little more than you did the day before. Bit by bit, increase the amount you walk. Walking boosts blood flow and helps prevent pneumonia and constipation. · Avoid lifting anything that would make you strain. This may include heavy grocery bags and milk containers, a heavy briefcase or backpack, cat litter or dog food bags, a vacuum , or a child. · Avoid strenuous activities, such as biking, jogging, weight lifting, or aerobic exercise, until your doctor says it is okay. · You may shower. Pat the cut (incision) dry. Do not take a bath for the first 2 weeks, or until your doctor tells you it is okay. · Ask your doctor when you can drive again. · You will probably need to take about 2 weeks off from work. It depends on the type of work you do and how you feel. · Your doctor will tell you when you can have sex again. Diet · You can eat your normal diet. If your stomach is upset, try bland, low-fat foods like plain rice, broiled chicken, toast, and yogurt. · Drink plenty of fluids (unless your doctor tells you not to). · You may notice that your bowel movements are not regular right after your surgery. This is common. Try to avoid constipation and straining with bowel movements. You may want to take a fiber supplement every day. If you have not had a bowel movement after a couple of days, ask your doctor about taking a mild laxative. Medicines · Your doctor will tell you if and when you can restart your medicines. He or she will also give you instructions about taking any new medicines. · If you take blood thinners, such as warfarin (Coumadin), clopidogrel (Plavix), or aspirin, be sure to talk to your doctor. He or she will tell you if and when to start taking those medicines again. Make sure that you understand exactly what your doctor wants you to do. · Be safe with medicines. Take pain medicines exactly as directed. ¨ If the doctor gave you a prescription medicine for pain, take it as prescribed. ¨ If you are not taking a prescription pain medicine, ask your doctor if you can take an over-the-counter medicine. · If you think your pain medicine is making you sick to your stomach: 
¨ Take your medicine after meals (unless your doctor has told you not to). ¨ Ask your doctor for a different pain medicine. · If your doctor prescribed antibiotics, take them as directed. Do not stop taking them just because you feel better. You need to take the full course of antibiotics. Incision care · You may have stitches over the cuts (incisions) the doctor made in your belly. If you have strips of tape on the incisions the doctor made, leave the tape on for a week or until it falls off. Or follow your doctor's instructions for removing the tape. · Wash the area daily with warm, soapy water, and pat it dry. Don't use hydrogen peroxide or alcohol, which can slow healing. You may cover the area with a gauze bandage if it weeps or rubs against clothing. Change the bandage every day. · Keep the area clean and dry. Other instructions · You may have some light vaginal bleeding. Wear sanitary pads if needed. Do not douche or use tampons. Follow-up care is a key part of your treatment and safety. Be sure to make and go to all appointments, and call your doctor if you are having problems. It's also a good idea to know your test results and keep a list of the medicines you take. When should you call for help? Call 911 anytime you think you may need emergency care. For example, call if: 
· You passed out (lost consciousness). · You have severe trouble breathing. · You have sudden chest pain and shortness of breath, or you cough up blood. Call your doctor now or seek immediate medical care if: 
· You have bright red vaginal bleeding that soaks one or more pads in an hour, or you have large clots. · You have foul-smelling discharge from your vagina. · You are sick to your stomach or cannot keep fluids down. · You have pain that does not get better after you take pain medicine. · You have loose stitches, or your incision comes open. · You have signs of infection, such as: 
¨ Increased pain, swelling, warmth, or redness. ¨ Red streaks leading from the incision. ¨ Pus draining from the incision. ¨ A fever. · You have signs of a blood clot, such as: 
¨ Pain in your calf, back of the knee, thigh, or groin. ¨ Redness and swelling in your leg or groin. · You have trouble passing urine or stool, especially if you have pain or swelling in your lower belly. Watch closely for changes in your health, and be sure to contact your doctor if: 
· You do not have a bowel movement after taking a laxative. · You have hot flashes, sweating, flushing, or a fast heartbeat, but no fever. Where can you learn more? Go to http://keely-kacie.info/. Enter Y677 in the search box to learn more about \"Laparoscopically Assisted Vaginal Hysterectomy: What to Expect at Home. \" Current as of: October 13, 2016 Content Version: 11.3 © 3193-3495 Healthwise, Incorporated. Care instructions adapted under license by [employer] (which disclaims liability or warranty for this information). If you have questions about a medical condition or this instruction, always ask your healthcare professional. [address] any warranty or liability for your use of this information. Discharge Orders None [employer] Announcement We are excited to announce that we are making your provider's discharge notes available to you in [employer]. You will see these notes when they are completed and signed by the physician that discharged you from your recent hospital stay. If you have any questions or concerns about any information you see in [employer], please call the Health Information Department where you were seen or reach out to your Primary Care Provider for more information about your plan of care. Introducing [facility]! [address] introduces [employer] patient portal. Now you can access parts of your medical record, email your doctor's office, and request medication refills online. 1. In your internet browser, go to [email or web address] 2. Click on the First Time User? Click Here link in the Sign In box. You will see the New Member Sign Up page. 3. Enter your [employer] Access Code exactly as it appears below. You will not need to use this code after youve completed the sign-up process. If you do not sign up before the expiration date, you must request a new code. · [employer] Access Code: [email or web address] Expires: 12/7/2017 10:17 AM 
 
4. Enter the last four digits of your Social Security Number (xxxx) and Date of Birth (mm/dd/yyyy) as indicated and click Submit. You will be taken to the next sign-up page. 5. Create a [employer] ID. This will be your [employer] login ID and cannot be changed, so think of one that is secure and easy to remember. 6. Create a [employer] password. You can change your password at any time. 7. Enter your Password Reset Question and Answer. This can be used at a later time if you forget your password. 8. Enter your e-mail address. You will receive e-mail notification when new information is available in [address] 9. Click Sign Up. You can now view and download portions of your medical record. 10. Click the Download Summary menu link to download a portable copy of your medical information. If you have questions, please visit the Frequently Asked Questions section of the [employer] website. Remember, [employer] is NOT to be used for urgent needs. For medical emergencies, dial 911. Now available from your iPhone and Android! General Information Please provide this summary of care documentation to your next provider. Patient Signature:  ____________________________________________________________ Date:  ____________________________________________________________  
  
Jaelyn  Provider Signature:  ____________________________________________________________ Date:  ____________________________________________________________

## 2017-10-11 NOTE — IP AVS SNAPSHOT
[address] 
[phone] Patient: Yeimy Madrigal MRN: [id number] JS Current Discharge Medication List  
  
START taking these medications Dose & Instructions Dispensing Information Comments Morning Noon Evening Bedtime  
 docusate sodium 100 mg capsule Commonly known as:  Ifrah Mule Your last dose was: Your next dose is:    
   
   
 Dose:  100 mg Take 1 Cap by mouth two (2) times daily as needed for Constipation for up to 90 days. Quantity:  60 Cap Refills:  2  
     
   
   
   
  
 oxyCODONE-acetaminophen 5-325 mg per tablet Commonly known as:  PERCOCET Your last dose was: Your next dose is:    
   
   
 Dose:  1 Tab Take 1 Tab by mouth every four (4) hours as needed. Max Daily Amount: 6 Tabs. Quantity:  30 Tab Refills:  0 CONTINUE these medications which have CHANGED Dose & Instructions Dispensing Information Comments Morning Noon Evening Bedtime * ibuprofen 800 mg tablet Commonly known as:  MOTRIN What changed:  Another medication with the same name was added. Make sure you understand how and when to take each. Your last dose was: Your next dose is:    
   
   
 Dose:  800 mg Take 800 mg by mouth every six (6) hours as needed for Pain. Refills:  0  
     
   
   
   
  
 * ibuprofen 800 mg tablet Commonly known as:  MOTRIN What changed: You were already taking a medication with the same name, and this prescription was added. Make sure you understand how and when to take each. Your last dose was: Your next dose is:    
   
   
 Dose:  800 mg Take 1 Tab by mouth every eight (8) hours as needed for Pain for up to 360 days. Quantity:  90 Tab Refills:  0  
     
   
   
   
  
 * Notice:   This list has 2 medication(s) that are the same as other medications prescribed for you. Read the directions carefully, and ask your doctor or other care provider to review them with you. CONTINUE these medications which have NOT CHANGED Dose & Instructions Dispensing Information Comments Morning Noon Evening Bedtime MULTIPLE VITAMINS WITH IRON chewable tablet Generic drug:  multivitamin with iron Your last dose was: Your next dose is:    
   
   
 Dose:  1 Tab Take 1 Tab by mouth daily. Refills:  0  
     
   
   
   
  
 polyethylene glycol 17 gram/dose powder Commonly known as:  Reda Wheelwright Your last dose was: Your next dose is:    
   
   
 Dose:  17 g Take 17 g by mouth daily. 1 tablespoon with 8 oz of water daily Quantity:  238 g Refills:  0  
     
   
   
   
  
 topiramate 50 mg tablet Commonly known as:  TOPAMAX Your last dose was: Your next dose is: TAKE 1 TABLET BY MOUTH TWICE A DAY Quantity:  180 Tab Refills:  0 ASK your doctor about these medications Dose & Instructions Dispensing Information Comments Morning Noon Evening Bedtime HYDROcodone-acetaminophen 5-325 mg per tablet Commonly known as:  Thelbert [place] Your last dose was: Your next dose is:    
   
   
 Dose:  2 Tab Take 2 Tabs by mouth every four (4) hours as needed for Pain. Refills:  0 SLOW FE PO Your last dose was: Your next dose is:    
   
   
 Dose:  1 Tab Take 1 Tab by mouth daily. Refills:  0  
     
   
   
   
  
 traMADol 50 mg tablet Commonly known as:  ULTRAM  
   
Your last dose was: Your next dose is:    
   
   
 Dose:  50 mg Take 50 mg by mouth every six (6) hours as needed for Pain. Refills:  0 Where to Get Your Medications Information on where to get these meds will be given to you by the nurse or doctor. ! Ask your nurse or doctor about these medications  
  docusate sodium 100 mg capsule  
 ibuprofen 800 mg tablet  
 oxyCODONE-acetaminophen 5-325 mg per tablet

## 2017-10-11 NOTE — PERIOP NOTES
Patient interviewed in holding area, identity and procedure verified. 1000 ml 0.9% NaCl as irrigation.   Attempted to contact family to inform of start of surgery, volunteer stated he had left hospital.

## 2017-10-11 NOTE — BRIEF OP NOTE
BRIEF OPERATIVE NOTE    Date of Procedure: 10/11/2017   Preoperative Diagnosis: FIRBOIDS/PELVIC PAIN  Postoperative Diagnosis: FIRBOIDS/PELVIC PAIN    Procedure(s):  DAVINCI LAPAROSCOPIC TOTAL HYSTRECTOMY WITH OVARIAN PRESERVATION, BILATERAL SALPINGECTOMY,/CYSTOSCOPY  CYSTOSCOPY  Surgeon(s) and Role:     * Lorrie Madera MD - Primary         Assistant Staff:   Romy José    Surgical Staff:  Circ-1: Heri Cornejo RN  Circ-Relief: Patrice Costa RN  Scrub Tech-1: Kingston Irwin  Scrub RN-Relief: Sam Frausto RN  Surg Asst-1: Massimo Velasco  Event Time In   Incision Start 7251   Incision Close 1128     Anesthesia: General   Estimated Blood Loss: 200 cc  Specimens:   ID Type Source Tests Collected by Time Destination   1 : uterus Fresh Uterus  Lorrie Madera MD 10/11/2017 1034 Pathology   2 : bilateral fallopian tubes Fresh Tissue  Lorrie Madera MD 10/11/2017 1036 Pathology      Findings: Very large uterus with multiple fibroids; pedunculated fundal fibroid about 8 cm on right side, multiple anterior fibroids ranging from 3-4 cm, normal bilateral tubes and ovaries;  Filshie clips on fallopian tubes; cystoscopy showed dye spilling from bilateral ureteral jets  Complications: none  Implants: * No implants in log *

## 2017-10-11 NOTE — H&P
Gynecology History and Physical    Name: Giovanna Campbell MRN: [id number] SSN: [id number]    YOB: 1980  Age: 40 y.o. Sex: female       Subjective:      Chief complaint:  Fibroids and Pelvic pain    Darya Brewer is a 40 y.o.  female with a history of fibroids, metrorrhagia and pelvic pain. Previous workup included Ultrasound which revealed fibroid(s), an endometrial biopsy nondiagnostic as she has had a previous endometrial ablation. Previous treatment measures included dilation and curretage, endometrial ablation and hormone therapy. She is admitted for Procedure(s) (LRB):  DAVINCI LAPAROSCOPIC TOTAL HYSTRECTOMY WITH OVARIAN PRESERVATION/CYSTOSCOP (N/A)  CYSTOSCOPY (N/A). The current method of family planning is tubal ligation.     OB History      Para Term  AB Living    7 2 2 0 5 2    SAB TAB Ectopic Molar Multiple Live Births    4 1    1        Past Medical History:   Diagnosis Date    Anemia NEC     on fe pill    Fibroid, uterine     Migraine     Postpartum depression     years ago     Past Surgical History:   Procedure Laterality Date    HX DILATION AND CURETTAGE  ,,,2003    x4    HX GYN      BTL 2013    HX GYN  2015    uterine ablation     Social History     Occupational History     Not Employed     Social History Main Topics    Smoking status: Current Every Day Smoker     Packs/day: 0.50     Years: 10.00     Last attempt to quit: 2013    Smokeless tobacco: Never Used    Alcohol use Yes      Comment: 2 DRINKS A MONTH    Drug use: 1.00 per week     Special: Cocaine      Comment: last cocaine use  per 10/1/15 PAT call    Sexual activity: Yes     Partners: Male     Birth control/ protection: Surgical      Comment: tubes tied     Family History   Problem Relation Age of Onset    Diabetes Mother     Hypertension Mother     High Cholesterol Mother     Stroke Mother     Cancer Maternal Grandmother     Migraines Sister     Migraines Brother         Allergies   Allergen Reactions    Penicillin G Rash     Prior to Admission medications    Medication Sig Start Date End Date Taking? Authorizing Provider   HYDROcodone-acetaminophen (NORCO) 5-325 mg per tablet Take 2 Tabs by mouth every four (4) hours as needed for Pain. Yes Historical Provider   topiramate (TOPAMAX) 50 mg tablet TAKE 1 TABLET BY MOUTH TWICE A DAY 10/6/17  Yes Arnold Alston NP   multivitamin with iron (MULTIPLE VITAMINS WITH IRON) chewable tablet Take 1 Tab by mouth daily. Yes Historical Provider   ibuprofen (MOTRIN) 800 mg tablet Take 800 mg by mouth every six (6) hours as needed for Pain. Yes Historical Provider   polyethylene glycol (MIRALAX) 17 gram/dose powder Take 17 g by mouth daily. 1 tablespoon with 8 oz of water daily 9/8/17  Yes Theresa Whitaker MD   traMADol (ULTRAM) 50 mg tablet Take 50 mg by mouth every six (6) hours as needed for Pain. Historical Provider   FERROUS SULFATE (SLOW FE PO) Take 1 Tab by mouth daily. Historical Provider        Review of Systems:  A comprehensive review of systems was negative except for that written in the History of Present Illness. Objective:     Vitals:    10/11/17 0730   BP: 113/73   Pulse: 67   Resp: 18   Temp: 97.7 °F (36.5 °C)   SpO2: 100%   Weight: 94.5 kg (208 lb 6 oz)   Height: 5' 5.5\" (1.664 m)       Physical Exam:  Patient without distress. Heart: Regular rate and rhythm  Lung: clear to auscultation throughout lung fields, no wheezes, no rales, no rhonchi and normal respiratory effort  Abdomen: soft, nontender, normal bowel sounds, no masses palpated  Ext: No C/C/E    Assessment:     Active Problems:    * No active hospital problems. *     Fibroids and Pelvic pain with failed medical and failed ablation. U/S on 9/29/17 showed an 8 cm fibroid and a 3 cm fibroid. One fibroid was pedunculated and one was intracavitary. Normal bilateral ovaries.     Plan:     Procedure(s) (LRB):  DAVINCI LAPAROSCOPIC TOTAL HYSTRECTOMY WITH OVARIAN PRESERVATION/CYSTOSCOP (N/A)  CYSTOSCOPY (N/A)  Discussed the risks of surgery including the risks of bleeding, infection, deep vein thrombosis, and surgical injuries to internal organs including but not limited to the bowels, bladder, rectum, and female reproductive organs. The patient understands the risks; any and all questions were answered to the patient's satisfaction.     Signed By:  Reymundo Paiz MD     October 11, 2017

## 2017-10-11 NOTE — PROGRESS NOTES
TRANSFER - OUT REPORT:    Verbal report given to ShaunnaRN(name) on Bethel Young  being transferred to 350(unit) for routine post - op       Report consisted of patients Situation, Background, Assessment and   Recommendations(SBAR). Time Pre op antibiotic given:9:21 Clindamycin  Anesthesia Stop time: 11:54  Franco Present on Transfer to floor:yes  Order for Franco on Chart:yes    Information from the following report(s) SBAR, Kardex, OR Summary, Procedure Summary, Intake/Output and MAR was reviewed with the receiving nurse. Opportunity for questions and clarification was provided. Is the patient on 02? NO       L/Min        Other     Is the patient on a monitor? NO    Is the nurse transporting with the patient? NO    Surgical Waiting Area notified of patient's transfer from PACU? YES      The following personal items collected during your admission accompanied patient upon transfer:   Dental Appliance: Dental Appliances: None  Vision: Visual Aid: None  Hearing Aid:    Jewelry: Jewelry: None  Clothing: Clothing: Other (comment) (CLOTHING & SHOES TO PACU)  Other Valuables:  Other Valuables: None  Valuables sent to safe:

## 2017-10-11 NOTE — ANESTHESIA PREPROCEDURE EVALUATION
Anesthetic History   No history of anesthetic complications            Review of Systems / Medical History  Patient summary reviewed, nursing notes reviewed and pertinent labs reviewed    Pulmonary  Within defined limits                 Neuro/Psych         Psychiatric history     Cardiovascular  Within defined limits                Exercise tolerance: >4 METS     GI/Hepatic/Renal  Within defined limits              Endo/Other        Obesity     Other Findings              Physical Exam    Airway  Mallampati: II  TM Distance: 4 - 6 cm  Neck ROM: normal range of motion   Mouth opening: Normal     Cardiovascular  Regular rate and rhythm,  S1 and S2 normal,  no murmur, click, rub, or gallop             Dental  No notable dental hx       Pulmonary  Breath sounds clear to auscultation               Abdominal  GI exam deferred       Other Findings            Anesthetic Plan    ASA: 2  Anesthesia type: general          Induction: Intravenous  Anesthetic plan and risks discussed with: Patient

## 2017-10-12 VITALS
RESPIRATION RATE: 16 BRPM | SYSTOLIC BLOOD PRESSURE: 115 MMHG | OXYGEN SATURATION: 100 % | HEART RATE: 69 BPM | BODY MASS INDEX: 33.49 KG/M2 | HEIGHT: 66 IN | WEIGHT: 208.38 LBS | TEMPERATURE: 98.9 F | DIASTOLIC BLOOD PRESSURE: 69 MMHG

## 2017-10-12 LAB
ALBUMIN SERPL-MCNC: 2.6 G/DL (ref 3.5–5)
ALBUMIN/GLOB SERPL: 0.7 {RATIO} (ref 1.1–2.2)
ALP SERPL-CCNC: 43 U/L (ref 45–117)
ALT SERPL-CCNC: 13 U/L (ref 12–78)
ANION GAP SERPL CALC-SCNC: 9 MMOL/L (ref 5–15)
AST SERPL-CCNC: 8 U/L (ref 15–37)
BILIRUB SERPL-MCNC: 0.3 MG/DL (ref 0.2–1)
BUN SERPL-MCNC: 5 MG/DL (ref 6–20)
BUN/CREAT SERPL: 8 (ref 12–20)
CALCIUM SERPL-MCNC: 7.8 MG/DL (ref 8.5–10.1)
CHLORIDE SERPL-SCNC: 110 MMOL/L (ref 97–108)
CO2 SERPL-SCNC: 20 MMOL/L (ref 21–32)
CREAT SERPL-MCNC: 0.66 MG/DL (ref 0.55–1.02)
ERYTHROCYTE [DISTWIDTH] IN BLOOD BY AUTOMATED COUNT: 14.1 % (ref 11.5–14.5)
GLOBULIN SER CALC-MCNC: 3.8 G/DL (ref 2–4)
GLUCOSE SERPL-MCNC: 144 MG/DL (ref 65–100)
HCT VFR BLD AUTO: 31.3 % (ref 35–47)
HGB BLD-MCNC: 10.3 G/DL (ref 11.5–16)
MCH RBC QN AUTO: 27.4 PG (ref 26–34)
MCHC RBC AUTO-ENTMCNC: 32.9 G/DL (ref 30–36.5)
MCV RBC AUTO: 83.2 FL (ref 80–99)
PLATELET # BLD AUTO: 222 K/UL (ref 150–400)
POTASSIUM SERPL-SCNC: 3.8 MMOL/L (ref 3.5–5.1)
PROT SERPL-MCNC: 6.4 G/DL (ref 6.4–8.2)
RBC # BLD AUTO: 3.76 M/UL (ref 3.8–5.2)
SODIUM SERPL-SCNC: 139 MMOL/L (ref 136–145)
WBC # BLD AUTO: 9.8 K/UL (ref 3.6–11)

## 2017-10-12 PROCEDURE — 85027 COMPLETE CBC AUTOMATED: CPT | Performed by: OBSTETRICS & GYNECOLOGY

## 2017-10-12 PROCEDURE — 74011250637 HC RX REV CODE- 250/637: Performed by: OBSTETRICS & GYNECOLOGY

## 2017-10-12 PROCEDURE — 99218 HC RM OBSERVATION: CPT

## 2017-10-12 PROCEDURE — 97161 PT EVAL LOW COMPLEX 20 MIN: CPT

## 2017-10-12 PROCEDURE — 97116 GAIT TRAINING THERAPY: CPT

## 2017-10-12 PROCEDURE — 74011258636 HC RX REV CODE- 258/636: Performed by: OBSTETRICS & GYNECOLOGY

## 2017-10-12 PROCEDURE — 74011000250 HC RX REV CODE- 250: Performed by: OBSTETRICS & GYNECOLOGY

## 2017-10-12 PROCEDURE — 36415 COLL VENOUS BLD VENIPUNCTURE: CPT | Performed by: OBSTETRICS & GYNECOLOGY

## 2017-10-12 PROCEDURE — 80053 COMPREHEN METABOLIC PANEL: CPT | Performed by: OBSTETRICS & GYNECOLOGY

## 2017-10-12 PROCEDURE — 74011250636 HC RX REV CODE- 250/636: Performed by: OBSTETRICS & GYNECOLOGY

## 2017-10-12 RX ORDER — DOCUSATE SODIUM 100 MG/1
100 CAPSULE, LIQUID FILLED ORAL
Qty: 60 CAP | Refills: 2 | Status: SHIPPED | OUTPATIENT
Start: 2017-10-12 | End: 2017-12-02

## 2017-10-12 RX ORDER — IBUPROFEN 800 MG/1
800 TABLET ORAL
Qty: 90 TAB | Refills: 0 | Status: SHIPPED | OUTPATIENT
Start: 2017-10-12 | End: 2017-12-02

## 2017-10-12 RX ORDER — OXYCODONE AND ACETAMINOPHEN 5; 325 MG/1; MG/1
1 TABLET ORAL
Qty: 30 TAB | Refills: 0 | Status: SHIPPED | OUTPATIENT
Start: 2017-10-12 | End: 2017-12-02

## 2017-10-12 RX ADMIN — OXYCODONE HYDROCHLORIDE AND ACETAMINOPHEN 2 TABLET: 5; 325 TABLET ORAL at 04:12

## 2017-10-12 RX ADMIN — OXYCODONE HYDROCHLORIDE AND ACETAMINOPHEN 1 TABLET: 5; 325 TABLET ORAL at 13:24

## 2017-10-12 RX ADMIN — POLYETHYLENE GLYCOL 3350 17 G: 17 POWDER, FOR SOLUTION ORAL at 09:09

## 2017-10-12 RX ADMIN — Medication 10 ML: at 14:00

## 2017-10-12 RX ADMIN — BISACODYL 10 MG: 10 SUPPOSITORY RECTAL at 06:03

## 2017-10-12 RX ADMIN — OXYCODONE HYDROCHLORIDE AND ACETAMINOPHEN 1 TABLET: 5; 325 TABLET ORAL at 09:09

## 2017-10-12 RX ADMIN — IBUPROFEN 800 MG: 400 TABLET, FILM COATED ORAL at 15:12

## 2017-10-12 RX ADMIN — SODIUM CHLORIDE, SODIUM LACTATE, POTASSIUM CHLORIDE, CALCIUM CHLORIDE, AND DEXTROSE MONOHYDRATE 150 ML/HR: 600; 310; 30; 20; 5 INJECTION, SOLUTION INTRAVENOUS at 02:11

## 2017-10-12 RX ADMIN — KETOROLAC TROMETHAMINE 30 MG: 30 INJECTION, SOLUTION INTRAMUSCULAR at 02:18

## 2017-10-12 RX ADMIN — SODIUM CHLORIDE 10 MG: 9 INJECTION INTRAMUSCULAR; INTRAVENOUS; SUBCUTANEOUS at 07:55

## 2017-10-12 RX ADMIN — TOPIRAMATE 50 MG: 25 TABLET, FILM COATED ORAL at 09:09

## 2017-10-12 RX ADMIN — SODIUM CHLORIDE, SODIUM LACTATE, POTASSIUM CHLORIDE, CALCIUM CHLORIDE, AND DEXTROSE MONOHYDRATE 150 ML/HR: 600; 310; 30; 20; 5 INJECTION, SOLUTION INTRAVENOUS at 09:09

## 2017-10-12 NOTE — DISCHARGE SUMMARY
Gynecology Discharge Summary     Name: Sukh Alexander MRN: [id number]  SSN: [id number]    YOB: 1980  Age: 40 y.o. Sex: female      Admit Date: 10/11/2017    Discharge Date: 10/12/2017      Admitting Physician: Rachelle Logan MD     Discharge Physician: Rachelle Logan MD     * Admission Diagnoses: FIRBOIDS/PELVIC PAIN;Fibroids    * Discharge Diagnoses:   Hospital Problems as of 10/12/2017  Date Reviewed: 10/11/2017          Codes Class Noted - Resolved POA    Fibroids ICD-10-CM: D25.9  ICD-9-CM: 218.9  10/11/2017 - Present Unknown               * Procedures: Robotic Assisted Total Laparoscopic Hysterectomy and cystosocpy    Consults: Physical Medicine and Rehabilitation    * Discharge Condition: good    * Hospital Course:   Normal hospital course for this procedure. Pt voided and passed flatus. Tolerating regular diet. Requested cane for walking due to stairs; PT consult for evaluation for need. Recommendation for cane at [facility] if needed. Incision C/D/I. * Discharge Disposition: Home    Discharge Medications:   Current Discharge Medication List      START taking these medications    Details   oxyCODONE-acetaminophen (PERCOCET) 5-325 mg per tablet Take 1 Tab by mouth every four (4) hours as needed. Max Daily Amount: 6 Tabs. Qty: 30 Tab, Refills: 0      !! ibuprofen (MOTRIN) 800 mg tablet Take 1 Tab by mouth every eight (8) hours as needed for Pain for up to 360 days. Qty: 90 Tab, Refills: 0      docusate sodium (COLACE) 100 mg capsule Take 1 Cap by mouth two (2) times daily as needed for Constipation for up to 90 days. Qty: 60 Cap, Refills: 2       !! - Potential duplicate medications found. Please discuss with provider.       CONTINUE these medications which have NOT CHANGED    Details   topiramate (TOPAMAX) 50 mg tablet TAKE 1 TABLET BY MOUTH TWICE A DAY  Qty: 180 Tab, Refills: 0    Associated Diagnoses: Migraine without status migrainosus, not intractable, unspecified migraine type multivitamin with iron (MULTIPLE VITAMINS WITH IRON) chewable tablet Take 1 Tab by mouth daily. !! ibuprofen (MOTRIN) 800 mg tablet Take 800 mg by mouth every six (6) hours as needed for Pain.      polyethylene glycol (MIRALAX) 17 gram/dose powder Take 17 g by mouth daily. 1 tablespoon with 8 oz of water daily  Qty: 238 g, Refills: 0       !! - Potential duplicate medications found. Please discuss with provider. * Follow-up Care/Patient Instructions: Activity: No sex, douching, or tampons for 6 weeks or as directed by your physician. No heavy lifting for 6 weeks. No driving while taking pain medication.   Diet: Resume pre-hospital diet  Wound Care: Keep wound clean and dry    Follow-up Information     Follow up With Details Comments [address], KAMALA [address]  [address]  [phone]            Signed By:  Erika Nieto MD     October 12, 2017

## 2017-10-12 NOTE — PROGRESS NOTES
Bedside and Verbal shift change report given to Jere Monroy RN (oncoming nurse) by Brittany Escobar RN (offgoing nurse). Report included the following information SBAR, Kardex, OR Summary, Procedure Summary, Intake/Output, MAR, Accordion and Recent Results.

## 2017-10-12 NOTE — DISCHARGE INSTRUCTIONS
Laparoscopically Assisted Vaginal Hysterectomy: What to Expect at [address] [place] [address] can expect to feel better and stronger each day, although you may need pain medicine for a week or two. You may get tired easily or have less energy than usual. This may last for several weeks after surgery. You will probably notice that your belly is swollen and puffy. This is common. The swelling will take several weeks to go down. It may take about 4 to 6 weeks to fully recover. The recovery time may be less for some patients. You may have some light vaginal bleeding. Don't have sex until the doctor says it is okay. Don't douche or put anything into your vagina, such as a tampon, until your doctor says it is okay. It is important to avoid lifting while you are recovering so that you can heal.  This care sheet gives you a general idea about how long it will take for you to recover. But each person recovers at a different pace. Follow the steps below to get better as quickly as possible. How can you care for yourself at home? Activity  · Rest when you feel tired. Getting enough sleep will help you recover. · Try to walk each day. Start by walking a little more than you did the day before. Bit by bit, increase the amount you walk. Walking boosts blood flow and helps prevent pneumonia and constipation. · Avoid lifting anything that would make you strain. This may include heavy grocery bags and milk containers, a heavy briefcase or backpack, cat litter or dog food bags, a vacuum , or a child. · Avoid strenuous activities, such as biking, jogging, weight lifting, or aerobic exercise, until your doctor says it is okay. · You may shower. Pat the cut (incision) dry. Do not take a bath for the first 2 weeks, or until your doctor tells you it is okay. · Ask your doctor when you can drive again. · You will probably need to take about 2 weeks off from work.  It depends on the type of work you do and how you feel.  · Your doctor will tell you when you can have sex again. Diet  · You can eat your normal diet. If your stomach is upset, try bland, low-fat foods like plain rice, broiled chicken, toast, and yogurt. · Drink plenty of fluids (unless your doctor tells you not to). · You may notice that your bowel movements are not regular right after your surgery. This is common. Try to avoid constipation and straining with bowel movements. You may want to take a fiber supplement every day. If you have not had a bowel movement after a couple of days, ask your doctor about taking a mild laxative. Medicines  · Your doctor will tell you if and when you can restart your medicines. He or she will also give you instructions about taking any new medicines. · If you take blood thinners, such as warfarin (Coumadin), clopidogrel (Plavix), or aspirin, be sure to talk to your doctor. He or she will tell you if and when to start taking those medicines again. Make sure that you understand exactly what your doctor wants you to do. · Be safe with medicines. Take pain medicines exactly as directed. ¨ If the doctor gave you a prescription medicine for pain, take it as prescribed. ¨ If you are not taking a prescription pain medicine, ask your doctor if you can take an over-the-counter medicine. · If you think your pain medicine is making you sick to your stomach:  ¨ Take your medicine after meals (unless your doctor has told you not to). ¨ Ask your doctor for a different pain medicine. · If your doctor prescribed antibiotics, take them as directed. Do not stop taking them just because you feel better. You need to take the full course of antibiotics. Incision care  · You may have stitches over the cuts (incisions) the doctor made in your belly. If you have strips of tape on the incisions the doctor made, leave the tape on for a week or until it falls off. Or follow your doctor's instructions for removing the tape.   · Wash the area daily with warm, soapy water, and pat it dry. Don't use hydrogen peroxide or alcohol, which can slow healing. You may cover the area with a gauze bandage if it weeps or rubs against clothing. Change the bandage every day. · Keep the area clean and dry. Other instructions  · You may have some light vaginal bleeding. Wear sanitary pads if needed. Do not douche or use tampons. Follow-up care is a key part of your treatment and safety. Be sure to make and go to all appointments, and call your doctor if you are having problems. It's also a good idea to know your test results and keep a list of the medicines you take. When should you call for help? Call 911 anytime you think you may need emergency care. For example, call if:  · You passed out (lost consciousness). · You have severe trouble breathing. · You have sudden chest pain and shortness of breath, or you cough up blood. Call your doctor now or seek immediate medical care if:  · You have bright red vaginal bleeding that soaks one or more pads in an hour, or you have large clots. · You have foul-smelling discharge from your vagina. · You are sick to your stomach or cannot keep fluids down. · You have pain that does not get better after you take pain medicine. · You have loose stitches, or your incision comes open. · You have signs of infection, such as:  ¨ Increased pain, swelling, warmth, or redness. ¨ Red streaks leading from the incision. ¨ Pus draining from the incision. ¨ A fever. · You have signs of a blood clot, such as:  ¨ Pain in your calf, back of the knee, thigh, or groin. ¨ Redness and swelling in your leg or groin. · You have trouble passing urine or stool, especially if you have pain or swelling in your lower belly. Watch closely for changes in your health, and be sure to contact your doctor if:  · You do not have a bowel movement after taking a laxative.   · You have hot flashes, sweating, flushing, or a fast heartbeat, but no fever.  Where can you learn more? Go to http://keely-kacie.info/. Enter X428 in the search box to learn more about \"Laparoscopically Assisted Vaginal Hysterectomy: What to Expect at Home. \"  Current as of: October 13, 2016  Content Version: 11.3  © 5433-4478 [employer]. Care instructions adapted under license by [employer] (which disclaims liability or warranty for this information). If you have questions about a medical condition or this instruction, always ask your healthcare professional. [address] any warranty or liability for your use of this information.

## 2017-10-12 NOTE — ANESTHESIA POSTPROCEDURE EVALUATION
Post-Anesthesia Evaluation and Assessment    Patient: Poncho Fisher MRN: [id number]  SSN: [id number]    YOB: 1980  Age: 40 y.o. Sex: female       Cardiovascular Function/Vital Signs  Visit Vitals    /65 (BP 1 Location: Right arm, BP Patient Position: At rest)    Pulse 70    Temp 36.4 °C (97.5 °F)    Resp 18    Ht 5' 5.5\" (1.664 m)    Wt 94.5 kg (208 lb 6 oz)    SpO2 100%    Breastfeeding No    BMI 34.15 kg/m2       Patient is status post general anesthesia for Procedure(s):  DAVINCI LAPAROSCOPIC TOTAL HYSTRECTOMY WITH OVARIAN PRESERVATION, BILATERAL SALPINGECTOMY,/CYSTOSCOPY  CYSTOSCOPY. Nausea/Vomiting: None    Postoperative hydration reviewed and adequate. Pain:  Pain Scale 1: Numeric (0 - 10) (10/11/17 2028)  Pain Intensity 1: 8 (10/11/17 2028)   Managed    Neurological Status:   Neuro (WDL): Exceptions to WDL (10/11/17 1230)  Neuro  Neurologic State: Alert (10/11/17 1337)  LUE Motor Response: Purposeful (10/11/17 1337)  LLE Motor Response: Purposeful (10/11/17 1337)  RUE Motor Response: Purposeful (10/11/17 1337)  RLE Motor Response: Purposeful (10/11/17 1337)   At baseline    Mental Status and Level of Consciousness: Arousable    Pulmonary Status:   O2 Device: Room air (10/11/17 1337)   Adequate oxygenation and airway patent    Complications related to anesthesia: None    Post-anesthesia assessment completed.  No concerns    Signed By: Franco Coelho MD     October 11, 2017

## 2017-12-02 ENCOUNTER — HOSPITAL ENCOUNTER (EMERGENCY)
Age: 37
Discharge: HOME | End: 2017-12-02
Attending: EMERGENCY MEDICINE
Payer: COMMERCIAL

## 2017-12-02 VITALS
HEIGHT: 66 IN | OXYGEN SATURATION: 98 % | WEIGHT: 203 LBS | BODY MASS INDEX: 32.62 KG/M2 | SYSTOLIC BLOOD PRESSURE: 121 MMHG | RESPIRATION RATE: 17 BRPM | DIASTOLIC BLOOD PRESSURE: 79 MMHG | HEART RATE: 68 BPM | TEMPERATURE: 98.1 F

## 2017-12-02 DIAGNOSIS — B37.31 VAGINAL YEAST INFECTION: Primary | ICD-10-CM

## 2017-12-02 LAB
APPEARANCE UR: CLEAR
BACTERIA URNS QL MICRO: NEGATIVE /HPF
BILIRUB UR QL: NEGATIVE
CLUE CELLS VAG QL WET PREP: NORMAL
COLOR UR: ABNORMAL
EPITH CASTS URNS QL MICRO: ABNORMAL /LPF
GLUCOSE UR STRIP.AUTO-MCNC: NEGATIVE MG/DL
HGB UR QL STRIP: NEGATIVE
HYALINE CASTS URNS QL MICRO: ABNORMAL /LPF (ref 0–5)
KETONES UR QL STRIP.AUTO: NEGATIVE MG/DL
KOH PREP SPEC: NORMAL
LEUKOCYTE ESTERASE UR QL STRIP.AUTO: ABNORMAL
NITRITE UR QL STRIP.AUTO: NEGATIVE
PH UR STRIP: 6 [PH] (ref 5–8)
PROT UR STRIP-MCNC: NEGATIVE MG/DL
RBC #/AREA URNS HPF: ABNORMAL /HPF (ref 0–5)
SERVICE CMNT-IMP: NORMAL
SP GR UR REFRACTOMETRY: 1.02 (ref 1–1.03)
T VAGINALIS VAG QL WET PREP: NORMAL
UR CULT HOLD, URHOLD: NORMAL
UROBILINOGEN UR QL STRIP.AUTO: 0.2 EU/DL (ref 0.2–1)
WBC URNS QL MICRO: ABNORMAL /HPF (ref 0–4)

## 2017-12-02 PROCEDURE — 87210 SMEAR WET MOUNT SALINE/INK: CPT | Performed by: EMERGENCY MEDICINE

## 2017-12-02 PROCEDURE — 87491 CHLMYD TRACH DNA AMP PROBE: CPT | Performed by: EMERGENCY MEDICINE

## 2017-12-02 PROCEDURE — 81001 URINALYSIS AUTO W/SCOPE: CPT | Performed by: EMERGENCY MEDICINE

## 2017-12-02 PROCEDURE — 74011250637 HC RX REV CODE- 250/637: Performed by: EMERGENCY MEDICINE

## 2017-12-02 PROCEDURE — 99284 EMERGENCY DEPT VISIT MOD MDM: CPT

## 2017-12-02 RX ORDER — IBUPROFEN 600 MG/1
600 TABLET ORAL
Status: COMPLETED | OUTPATIENT
Start: 2017-12-02 | End: 2017-12-02

## 2017-12-02 RX ORDER — BISMUTH SUBSALICYLATE 262 MG
1 TABLET,CHEWABLE ORAL DAILY
COMMUNITY
End: 2018-04-18

## 2017-12-02 RX ORDER — FLUCONAZOLE 150 MG/1
150 TABLET ORAL
Qty: 1 TAB | Refills: 0 | Status: SHIPPED | OUTPATIENT
Start: 2017-12-02 | End: 2017-12-02

## 2017-12-02 RX ADMIN — IBUPROFEN 600 MG: 600 TABLET, FILM COATED ORAL at 09:05

## 2017-12-02 NOTE — PROGRESS NOTES
Prior to Admission Medications   Prescriptions Last Dose Informant Patient Reported? Taking?   multivitamin (ONE A DAY) tablet 11/25/2017 at Unknown time  Yes Yes   Sig: Take 1 Tab by mouth daily. polyethylene glycol (MIRALAX) 17 gram/dose powder 11/2/2017 at Unknown time  No Yes   Sig: Take 17 g by mouth daily. 1 tablespoon with 8 oz of water daily   topiramate (TOPAMAX) 50 mg tablet 12/2/2017 at 0600  No Yes   Sig: TAKE 1 TABLET BY MOUTH TWICE A DAY      Facility-Administered Medications: None   Self: List updated per patient interview. Multivitamin updated. Colace, motrin and percocet removed.

## 2017-12-02 NOTE — DISCHARGE INSTRUCTIONS
Vaginal Yeast Infection: Care Instructions  Your Care Instructions    A vaginal yeast infection is caused by too many yeast cells in the vagina. This is common in women of all ages. Itching, vaginal discharge and irritation, and other symptoms can bother you. But yeast infections don't often cause other health problems. Some medicines can increase your risk of getting a yeast infection. These include antibiotics, birth control pills, hormones, and steroids. You may also be more likely to get a yeast infection if you are pregnant, have diabetes, douche, or wear tight clothes. With treatment, most yeast infections get better in 2 to 3 days. Follow-up care is a key part of your treatment and safety. Be sure to make and go to all appointments, and call your doctor if you are having problems. It's also a good idea to know your test results and keep a list of the medicines you take. How can you care for yourself at home? · Take your medicines exactly as prescribed. Call your doctor if you think you are having a problem with your medicine. · Ask your doctor about over-the-counter (OTC) medicines for yeast infections. They may cost less than prescription medicines. If you use an OTC treatment, read and follow all instructions on the label. · Do not use tampons while using a vaginal cream or suppository. The tampons can absorb the medicine. Use pads instead. · Wear loose cotton clothing. Do not wear nylon or other fabric that holds body heat and moisture close to the skin. · Try sleeping without underwear. · Do not scratch. Relieve itching with a cold pack or a cool bath. · Do not wash your vaginal area more than once a day. Use plain water or a mild, unscented soap. Air-dry the vaginal area. · Change out of wet swimsuits after swimming. · Do not have sex until you have finished your treatment. · Do not douche. When should you call for help?   Call your doctor now or seek immediate medical care if:  ? · You have unexpected vaginal bleeding. ? · You have new or increased pain in your vagina or pelvis. ? Watch closely for changes in your health, and be sure to contact your doctor if:  ? · You have a fever. ? · You are not getting better after 2 days. ? · Your symptoms come back after you finish your medicines. Where can you learn more? Go to http://keely-kacie.info/. Enter W761 in the search box to learn more about \"Vaginal Yeast Infection: Care Instructions. \"  Current as of: October 13, 2016  Content Version: 11.4  © 4152-5288 [employer]. Care instructions adapted under license by [employer] (which disclaims liability or warranty for this information). If you have questions about a medical condition or this instruction, always ask your healthcare professional. [address] any warranty or liability for your use of this information. We hope that we have addressed all of your medical concerns. The examination and treatment you received in the Emergency Department were for an emergent problem and were not intended as complete care. It is important that you follow up with your healthcare provider(s) for ongoing care. If your symptoms worsen or do not improve as expected, and you are unable to reach your usual health care provider(s), you should return to the Emergency Department. Today's healthcare is undergoing tremendous change, and patient satisfaction surveys are one of the many tools to assess the quality of medical care. You may receive a survey from the [employer] organization regarding your experience in the Emergency Department. I hope that your experience has been completely positive, particularly the medical care that I provided. As such, please participate in the survey; anything less than excellent does not meet my expectations or intentions.         [facility] and [employer] participate in nationally recognized quality of care measures. If your blood pressure is greater than 120/80, as reported below, we urge that you seek medical care to address the potential of high blood pressure, commonly known as hypertension. Hypertension can be hereditary or can be caused by certain medical conditions, pain, stress, or \"white coat syndrome. \"       Please make an appointment with your health care provider(s) for follow up of your Emergency Department visit. VITALS:   Patient Vitals for the past 8 hrs:   Temp Pulse Resp BP SpO2   12/02/17 0726 98.1 °F (36.7 °C) 65 18 122/83 99 %          Thank you for allowing us to provide you with medical care today. We realize that you have many choices for your emergency care needs. Please choose us in the future for any continued health care needs. Nina Almazan MD    [address] [place] [address].   Office: [phone]            Recent Results (from the past 24 hour(s))   URINE CULTURE HOLD SAMPLE    Collection Time: 12/02/17  8:32 AM   Result Value Ref Range    Urine culture hold URINE ON HOLD IN MICROBIOLOGY DEPT FOR 3 DAYS         No results found.

## 2017-12-02 NOTE — ED TRIAGE NOTES
Triage: Patient arrives ambulatory from home with c/o pelvic pain, mostly on left side, with associated white vaginal discharge. Patient felt she had yeast infection so began taking monistat and OB prescribed diflucan. No improvement in sx. Patient now states pain is worse and feels she is having fevers, possibility of STI. +dysuria.

## 2017-12-02 NOTE — ED PROVIDER NOTES
HPI Comments: 40 y.o. female with past medical history significant for anemia, post partum depression, uterine fibroids, migraine who presents from home with chief complaint of abdominal pain. Pt reports constant, worsening LLQ pain onset 5 days ago, accompanied by vaginal discharge. Pt has been taking OTC medication for yeast infection and diflucan with no relief of sx. Pt reports that the pain is better when she is laying down. Pt has taken one dose of ibuprofen with little relief of sx. Pt has had a hysterectomy. Denies hx of similar sx. Denies nausea, vomiting, or vaginal bleeding. There are no other acute medical concerns at this time. Social hx: +tobacco smoker (0.5 packs/day), +EtOH, +hysterectomy, +    PCP: Jesika Eaton NP    Note written by Aman Barba, as dictated by Adán Mcdaniel MD 7:39 AM      The history is provided by the patient. No  was used.         Past Medical History:   Diagnosis Date    Anemia NEC     on fe pill    Fibroid, uterine     Migraine     Postpartum depression     years ago       Past Surgical History:   Procedure Laterality Date    HX DILATION AND CURETTAGE  1998,2000,2001,2003    x4    HX GYN      BTL 2013    HX GYN  2015    uterine ablation         Family History:   Problem Relation Age of Onset    Diabetes Mother     Hypertension Mother     High Cholesterol Mother     Stroke Mother     Cancer Maternal Grandmother     Migraines Sister     Migraines Brother        Social History     Social History    Marital status:      Spouse name: [place] Number of children: 1    Years of education: 15     Occupational History     Not Employed     Social History Main Topics    Smoking status: Current Every Day Smoker     Packs/day: 0.50     Years: 10.00     Last attempt to quit: 4/8/2013    Smokeless tobacco: Never Used    Alcohol use Yes      Comment: 2 DRINKS A MONTH    Drug use: 1.00 per week     Special: Cocaine      Comment: last cocaine use 2005 per 10/1/15 PAT call    Sexual activity: Yes     Partners: Male     Birth control/ protection: Surgical      Comment: tubes tied     Other Topics Concern     Service No    Blood Transfusions No    Caffeine Concern No    Occupational Exposure No    Hobby Hazards No    Sleep Concern No    Stress Concern Yes    Weight Concern No    Special Diet No    Back Care No    Exercise No    Seat Belt Yes    Self-Exams Yes     Social History Narrative         ALLERGIES: Penicillin g    Review of Systems   Constitutional: Negative for fever. HENT: Negative for facial swelling. Eyes: Negative for visual disturbance. Respiratory: Negative for chest tightness. Cardiovascular: Negative for chest pain. Gastrointestinal: Negative for abdominal pain. Genitourinary: Positive for dysuria and vaginal discharge. Negative for vaginal bleeding and vaginal pain. Musculoskeletal: Negative for arthralgias. Skin: Negative for rash. Neurological: Negative for dizziness. Hematological: Negative for adenopathy. Psychiatric/Behavioral: Negative for suicidal ideas. Vitals:    12/02/17 0726   BP: 122/83   Pulse: 65   Resp: 18   Temp: 98.1 °F (36.7 °C)   SpO2: 99%   Weight: 92.1 kg (203 lb)   Height: 5' 6\" (1.676 m)            Physical Exam   Constitutional: She is oriented to person, place, and time. She appears well-developed and well-nourished. No distress. HENT:   Head: Normocephalic and atraumatic. Mouth/Throat: Oropharynx is clear and moist.   Eyes: Pupils are equal, round, and reactive to light. No scleral icterus. Neck: Normal range of motion. Neck supple. No thyromegaly present. Cardiovascular: Normal rate, regular rhythm, normal heart sounds and intact distal pulses. No murmur heard. Pulmonary/Chest: Effort normal and breath sounds normal. No respiratory distress. Abdominal: Soft. Bowel sounds are normal. She exhibits no distension. There is no tenderness. Genitourinary:   Genitourinary Comments: Pelvic: thick, cheesy appearing discharge. No tenderness. Musculoskeletal: Normal range of motion. She exhibits no edema. Neurological: She is alert and oriented to person, place, and time. Skin: Skin is warm and dry. No rash noted. She is not diaphoretic. Nursing note and vitals reviewed. Note written by Aman Drummond, as dictated by Juno Lundberg MD 7:48 AM        MDM  ED Course       Procedures    Exam suggestive of vaginal yeast.  GC/Chlamydia sent. Will treat with monistat and diflucan. F/u with PCP if no improvement.

## 2017-12-04 LAB
C TRACH DNA SPEC QL NAA+PROBE: NEGATIVE
N GONORRHOEA DNA SPEC QL NAA+PROBE: NEGATIVE
SAMPLE TYPE: NORMAL
SERVICE CMNT-IMP: NORMAL
SPECIMEN SOURCE: NORMAL

## 2018-01-26 NOTE — OP NOTES
[address] [facility] CARE OP NOTE    Name:Rakesh ALCANTARA  MR#: [id number]  : 1980  ACCOUNT #: [de-identified]   DATE OF SERVICE: 10/11/2017    PREOPERATIVE DIAGNOSES:  1. Uterine fibroids. 2.  Pelvic pain. POSTOPERATIVE DIAGNOSES:  1. Uterine fibroids. 2.  Pelvic pain. PROCEDURES PERFORMED:   1.  da Meche laparoscopic total hysterectomy with ovarian preservation. 2.  Bilateral salpingectomy. 3.  Cystoscopy. SURGEON:  Bandar Valenzuela MD    ASSISTANT: Marga Madera. ANESTHESIA:  General.    ESTIMATED BLOOD LOSS:  200 mL. FINDINGS:  The patient was found to have a very large uterus with multiple fibroids, a pedunculated fundal fibroid about 8 cm on the right side was observed. In addition, multiple anterior fibroids ranging from 3-4 cm in size were observed. The patient had normal bilateral fallopian tubes and ovaries. Filshie clips were present on her fallopian tubes as she is status post previous tubal ligation. Cystoscopy revealed dye spilling from the bilateral ureteral jets. COMPLICATIONS:  None. SPECIMENS REMOVED:   1. Uterus with cervix. 2.  Bilateral fallopian tubes. IMPLANTS:     DESCRIPTION OF PROCEDURE:  After the risks, benefits and alternatives were discussed with the patient and informed consent was obtained, the patient was taken to the operating room. She underwent general anesthesia without complications. SCDs were placed on the legs for DVT prophylaxis. The patient's legs were then placed in [address] [place] [address]. The patient was prepped and draped in the usual sterile fashion. A Franco catheter was placed to straight drain using sterile technique. A medium VCare uterine manipulator was placed in proper technique. The patient received 2 gm of IV ancef prior to the start of the procedure. Allis was then used to grasp the umbilicus and an 8 mm infraumbilical skin incision was made with a scalpel.   The Veress needle was used to enter the abdomen. Intra-abdominal placement was confirmed with a water-filled syringe. Next, a size 10/12 disposable laparoscopic trocar was placed at the umbilicus without any complications. The da Meche camera was placed and survey of the abdomen and pelvis revealed the above-noted findings. Next, approximately 5 cm lateral and 5 cm inferior, an 8 mm skin incision was made with a scalpel and a DaVinci 8 mm bladed trocar was placed under direct laparoscopic visualization. This procedure was repeated on the patient's opposite side. Next, an assistant port was placed in the patient's left lower quadrant. The patient had been placed in steep Trendelenburg and all pressure points had been properly padded. The [employer] robot was docked in the proper docking technique. A PK was placed in the robot's left hand and hot donny were placed in the robot's right hand. The bilateral ureters were identified at the beginning of this procedure and the course of the ureters were noted along the pelvic side wall. Attention was first paid to the patient's right adnexa. The PK was used to clamp, cauterize and hot donny were used to cut the patient's round ligament. This was advanced progressively down the patient's right broad ligament. Care was made to avoid the ureters throughout the entire procedure. As the patient wished to keep her ovaries, the infundibulopelvic ligament was clamped proximally. The ovaries were preserved. The bladder flap was created using a combination of sharp and blunt technique. The bladder was pushed away from the surgical field. This procedure was repeated on the patient's opposite side. The uterosacral ligaments were identified. The VCare uterine manipulator was identified at the cup and the uterus with cervix was cauterized and transected and circumscribed around the VCare cup. The uterus with cervix was then removed from the patient's vagina. Pneumoperitoneum was maintained. The patient's right fallopian tube was identified, cauterized and transected along with the patient's left fallopian tube, which was then passed also through the vagina, which was sent to pathology. Hemostasis was observed. Bilateral ureters were noted to be peristalsing. A 0 V-Loc was passed and was used to close the vaginal cuff without any complications. FloSeal was placed for hemostasis prophylaxis. Interceed was placed on this surgical site. Hemostasis was noted. Bilateral ureters were noted to be peristalsing. The [employer] robot was undocked in the proper docking technique. Next, indigo carmine was injected intravenously and cystoscopy was performed and bilateral ureteral jets were noted, indicating intact ureters. The cystoscope was then removed. The 8 mm and 12 mm ports fascia was then closed using 0 Vicryl in a figure-of-eight stitch, 2-0 Vicryl was used to close the subcutaneous tissues and Dermabond was placed on the skin. All instruments were then removed from the patient's vagina. The Franco catheter remained. The patient was awakened from general anesthesia without any complications and transported to the recovery room awake, alert, in stable condition. All sponge, lap and instrument counts   were correct x2 per the OR staff.       MD ALEX Cormier / Denise Lucas  D: 01/26/2018 11:02     T: 01/26/2018 12:28  JOB #: [id number]

## 2018-02-01 ENCOUNTER — OFFICE VISIT (OUTPATIENT)
Dept: INTERNAL MEDICINE CLINIC | Age: 38
End: 2018-02-01

## 2018-02-01 VITALS
RESPIRATION RATE: 16 BRPM | BODY MASS INDEX: 33.11 KG/M2 | TEMPERATURE: 97.9 F | SYSTOLIC BLOOD PRESSURE: 123 MMHG | WEIGHT: 206 LBS | HEART RATE: 66 BPM | OXYGEN SATURATION: 96 % | HEIGHT: 66 IN | DIASTOLIC BLOOD PRESSURE: 77 MMHG

## 2018-02-01 DIAGNOSIS — R51.9 HEADACHE DUE TO VIRAL INFECTION: ICD-10-CM

## 2018-02-01 DIAGNOSIS — B34.9 HEADACHE DUE TO VIRAL INFECTION: ICD-10-CM

## 2018-02-01 DIAGNOSIS — G43.001 MIGRAINE WITHOUT AURA AND WITH STATUS MIGRAINOSUS, NOT INTRACTABLE: Primary | ICD-10-CM

## 2018-02-01 LAB
FLUAV+FLUBV AG NOSE QL IA.RAPID: NEGATIVE POS/NEG
FLUAV+FLUBV AG NOSE QL IA.RAPID: NEGATIVE POS/NEG
VALID INTERNAL CONTROL?: YES

## 2018-02-01 RX ORDER — SUMATRIPTAN 100 MG/1
100 TABLET, FILM COATED ORAL
Qty: 15 TAB | Refills: 0 | Status: SHIPPED | OUTPATIENT
Start: 2018-02-01 | End: 2018-04-18 | Stop reason: SDUPTHER

## 2018-02-01 NOTE — MR AVS SNAPSHOT
[address] [facility] [address] [facility] [address] 
[phone] Patient: Ryann Rahman MRN: [id number] QWO:[id number][id number] Visit Information Date & Time Provider Department Dept. Phone Encounter #  
 2/1/2018 12:00 PM Troy Mccullough [id number] Pediatrics and Internal Medicine [phone] [id number] Upcoming Health Maintenance Date Due Influenza Age 5 to Adult 8/1/2017 PAP AKA CERVICAL CYTOLOGY 2/18/2018 DTaP/Tdap/Td series (2 - Td) 3/10/2027 Allergies as of 2/1/2018  Review Complete On: 2/1/2018 By: Mary Castillo NP Severity Noted Reaction Type Reactions Penicillin G  12/15/2012    Rash Current Immunizations  Reviewed on 3/24/2014 Name Date Influenza Vaccine (Quad) PF  Incomplete MMR 7/27/2013  1:35 PM  
 TB Skin Test (PPD) Intradermal 3/21/2014 Not reviewed this visit You Were Diagnosed With   
  
 Codes Comments Migraine without aura and with status migrainosus, not intractable    -  Primary ICD-10-CM: G43.001 ICD-9-CM: 346.12 Headache due to viral infection     ICD-10-CM: B34.9, R51 ICD-9-CM: 079.99, 784.0 Vitals BP Pulse Temp Resp Height(growth percentile) Weight(growth percentile) 123/77 (BP 1 Location: Right arm, BP Patient Position: Sitting) 66 97.9 °F (36.6 °C) (Oral) 16 5' 5.98\" (1.676 m) 206 lb (93.4 kg) LMP SpO2 BMI OB Status Smoking Status 09/17/2017 96% 33.27 kg/m2 Hysterectomy Current Every Day Smoker BMI and BSA Data Body Mass Index Body Surface Area  
 33.27 kg/m 2 2.09 m 2 Preferred Pharmacy Pharmacy Name Phone [id number], [address] [place] [address] [phone] Your Updated Medication List  
  
   
This list is accurate as of: 2/1/18 12:50 PM.  Always use your most recent med list.  
  
  
  
  
 multivitamin tablet Commonly known as:  ONE A DAY Take 1 Tab by mouth daily. polyethylene glycol 17 gram/dose powder Commonly known as:  Yvetta Slice Take 17 g by mouth daily. 1 tablespoon with 8 oz of water daily SUMAtriptan 100 mg tablet Commonly known as:  IMITREX Take 1 Tab by mouth once as needed for Migraine for up to 1 dose. May repeat after 2 hours if not better, maximum 2 tablets in 24 hours  Indications: Migraine  
  
 topiramate 50 mg tablet Commonly known as:  TOPAMAX TAKE 1 TABLET BY MOUTH TWICE A DAY Prescriptions Printed Refills SUMAtriptan (IMITREX) 100 mg tablet 0 Sig: Take 1 Tab by mouth once as needed for Migraine for up to 1 dose. May repeat after 2 hours if not better, maximum 2 tablets in 24 hours  Indications: Migraine Class: Print Route: Oral  
  
We Performed the Following AMB POC CRISSY INFLUENZA A/B TEST [61468 CPT(R)] REFERRAL TO NEUROLOGY [DRU77 Custom] Referral Information Referral ID Referred By Referred To  
  
 [id number] Debbie Nova MD   
   [address] [place] [address] [address] Nehemiah Moore, [address] Phone: [phone] Fax: [phone] Visits Status Start Date End Date 1 New Request 2/1/18 2/1/19 If your referral has a status of pending review or denied, additional information will be sent to support the outcome of this decision. Patient Instructions Migraine Headache: Care Instructions Your Care Instructions Migraines are painful, throbbing headaches that often start on one side of the head. They may cause nausea and vomiting and make you sensitive to light, sound, or smell. Without treatment, migraines can last from 4 hours to a few days. Medicines can help prevent migraines or stop them after they have started. Your doctor can help you find which ones work best for you. Follow-up care is a key part of your treatment and safety.  Be sure to make and go to all appointments, and call your doctor if you are having problems. It's also a good idea to know your test results and keep a list of the medicines you take. How can you care for yourself at home? · Do not drive if you have taken a prescription pain medicine. · Rest in a quiet, dark room until your headache is gone. Close your eyes, and try to relax or go to sleep. Don't watch TV or read. · Put a cold, moist cloth or cold pack on the painful area for 10 to 20 minutes at a time. Put a thin cloth between the cold pack and your skin. · Use a warm, moist towel or a heating pad set on low to relax tight shoulder and neck muscles. · Have someone gently massage your neck and shoulders. · Take your medicines exactly as prescribed. Call your doctor if you think you are having a problem with your medicine. You will get more details on the specific medicines your doctor prescribes. · Be careful not to take pain medicine more often than the instructions allow. You could get worse or more frequent headaches when the medicine wears off. To prevent migraines · Keep a headache diary so you can figure out what triggers your headaches. Avoiding triggers may help you prevent headaches. Record when each headache began, how long it lasted, and what the pain was like. (Was it throbbing, aching, stabbing, or dull?) Write down any other symptoms you had with the headache, such as nausea, flashing lights or dark spots, or sensitivity to bright light or loud noise. Note if the headache occurred near your period. List anything that might have triggered the headache. Triggers may include certain foods (chocolate, cheese, wine) or odors, smoke, bright light, stress, or lack of sleep. · If your doctor has prescribed medicine for your migraines, take it as directed. You may have medicine that you take only when you get a migraine and medicine that you take all the time to help prevent migraines.  
¨ If your doctor has prescribed medicine for when you get a headache, take it at the first sign of a migraine, unless your doctor has given you other instructions. ¨ If your doctor has prescribed medicine to prevent migraines, take it exactly as prescribed. Call your doctor if you think you are having a problem with your medicine. · Find healthy ways to deal with stress. Migraines are most common during or right after stressful times. Take time to relax before and after you do something that has caused a migraine in the past. 
· Try to keep your muscles relaxed by keeping good posture. Check your jaw, face, neck, and shoulder muscles for tension. Try to relax them. When you sit at a desk, change positions often. And make sure to stretch for 30 seconds each hour. · Get plenty of sleep and exercise. · Eat meals on a regular schedule. Avoid foods and drinks that often trigger migraines. These include chocolate, alcohol (especially red wine and port), aspartame, monosodium glutamate (MSG), and some additives found in foods (such as hot dogs, suarez, cold cuts, aged cheeses, and pickled foods). · Limit caffeine. Don't drink too much coffee, tea, or soda. But don't quit caffeine suddenly. That can also give you migraines. · Do not smoke or allow others to smoke around you. If you need help quitting, talk to your doctor about stop-smoking programs and medicines. These can increase your chances of quitting for good. · If you are taking birth control pills or hormone therapy, talk to your doctor about whether they are triggering your migraines. When should you call for help? Call 911 anytime you think you may need emergency care. For example, call if: 
? · You have signs of a stroke. These may include: 
¨ Sudden numbness, paralysis, or weakness in your face, arm, or leg, especially on only one side of your body. ¨ Sudden vision changes. ¨ Sudden trouble speaking. ¨ Sudden confusion or trouble understanding simple statements. ¨ Sudden problems with walking or balance. ¨ A sudden, severe headache that is different from past headaches. ?Call your doctor now or seek immediate medical care if: 
? · You have new or worse nausea and vomiting. ? · You have a new or higher fever. ? · Your headache gets much worse. ? Watch closely for changes in your health, and be sure to contact your doctor if: 
? · You are not getting better after 2 days (48 hours). Where can you learn more? Go to http://keely-kacie.info/. Enter L855 in the search box to learn more about \"Migraine Headache: Care Instructions. \" Current as of: October 14, 2016 Content Version: 11.4 © 9124-2598 [employer]. Care instructions adapted under license by [employer] (which disclaims liability or warranty for this information). If you have questions about a medical condition or this instruction, always ask your healthcare professional. [address] any warranty or liability for your use of this information. Introducing [facility] [place] [facility]! Ximena Schaffer introduces School[employer] patient portal. Now you can access parts of your medical record, email your doctor's office, and request medication refills online. 1. In your internet browser, go to [email or web address] 2. Click on the First Time User? Click Here link in the Sign In box. You will see the New Member Sign Up page. 3. Enter your SchoolTube Access Code exactly as it appears below. You will not need to use this code after youve completed the sign-up process. If you do not sign up before the expiration date, you must request a new code. · [employer] Access Code: [email or web address] Expires: 3/24/2018  1:03 PM 
 
4. Enter the last four digits of your Social Security Number (xxxx) and Date of Birth (mm/dd/yyyy) as indicated and click Submit. You will be taken to the next sign-up page. 5. Create a SchoolTube ID.  This will be your SchoolTube login ID and cannot be changed, so think of one that is secure and easy to remember. 6. Create a Sway password. You can change your password at any time. 7. Enter your Password Reset Question and Answer. This can be used at a later time if you forget your password. 8. Enter your e-mail address. You will receive e-mail notification when new information is available in [address] 9. Click Sign Up. You can now view and download portions of your medical record. 10. Click the Download Summary menu link to download a portable copy of your medical information. If you have questions, please visit the Frequently Asked Questions section of the Sway website. Remember, Sway is NOT to be used for urgent needs. For medical emergencies, dial 911. Now available from your iPhone and Android! Please provide this summary of care documentation to your next provider. Your primary care clinician is listed as William Dill. If you have any questions after today's visit, please call [phone].

## 2018-02-01 NOTE — PROGRESS NOTES
RM#[id number]  Chief Complaint   Patient presents with    Head Pain     Migraine since yesterday, possible fever    Sleep Problem     Results for orders placed or performed in visit on 02/01/18   AMB POC CRISSY INFLUENZA A/B TEST   Result Value Ref Range    VALID INTERNAL CONTROL POC Yes     Influenza A Ag POC Negative Negative Pos/Neg    Influenza B Ag POC Negative Negative Pos/Neg       1. Have you been to the ER, urgent care clinic since your last visit? Hospitalized since your last visit? No    2. Have you seen or consulted any other health care providers outside of the [address] [place] [address] since your last visit? Include any pap smears or colon screening.  No  Health Maintenance Due   Topic Date Due    Influenza Age 5 to Adult  08/01/2017    PAP AKA CERVICAL CYTOLOGY  02/18/2018

## 2018-02-01 NOTE — PATIENT INSTRUCTIONS
Migraine Headache: Care Instructions  Your Care Instructions  Migraines are painful, throbbing headaches that often start on one side of the head. They may cause nausea and vomiting and make you sensitive to light, sound, or smell. Without treatment, migraines can last from 4 hours to a few days. Medicines can help prevent migraines or stop them after they have started. Your doctor can help you find which ones work best for you. Follow-up care is a key part of your treatment and safety. Be sure to make and go to all appointments, and call your doctor if you are having problems. It's also a good idea to know your test results and keep a list of the medicines you take. How can you care for yourself at home? · Do not drive if you have taken a prescription pain medicine. · Rest in a quiet, dark room until your headache is gone. Close your eyes, and try to relax or go to sleep. Don't watch TV or read. · Put a cold, moist cloth or cold pack on the painful area for 10 to 20 minutes at a time. Put a thin cloth between the cold pack and your skin. · Use a warm, moist towel or a heating pad set on low to relax tight shoulder and neck muscles. · Have someone gently massage your neck and shoulders. · Take your medicines exactly as prescribed. Call your doctor if you think you are having a problem with your medicine. You will get more details on the specific medicines your doctor prescribes. · Be careful not to take pain medicine more often than the instructions allow. You could get worse or more frequent headaches when the medicine wears off. To prevent migraines  · Keep a headache diary so you can figure out what triggers your headaches. Avoiding triggers may help you prevent headaches. Record when each headache began, how long it lasted, and what the pain was like.  (Was it throbbing, aching, stabbing, or dull?) Write down any other symptoms you had with the headache, such as nausea, flashing lights or dark spots, or sensitivity to bright light or loud noise. Note if the headache occurred near your period. List anything that might have triggered the headache. Triggers may include certain foods (chocolate, cheese, wine) or odors, smoke, bright light, stress, or lack of sleep. · If your doctor has prescribed medicine for your migraines, take it as directed. You may have medicine that you take only when you get a migraine and medicine that you take all the time to help prevent migraines. ¨ If your doctor has prescribed medicine for when you get a headache, take it at the first sign of a migraine, unless your doctor has given you other instructions. ¨ If your doctor has prescribed medicine to prevent migraines, take it exactly as prescribed. Call your doctor if you think you are having a problem with your medicine. · Find healthy ways to deal with stress. Migraines are most common during or right after stressful times. Take time to relax before and after you do something that has caused a migraine in the past.  · Try to keep your muscles relaxed by keeping good posture. Check your jaw, face, neck, and shoulder muscles for tension. Try to relax them. When you sit at a desk, change positions often. And make sure to stretch for 30 seconds each hour. · Get plenty of sleep and exercise. · Eat meals on a regular schedule. Avoid foods and drinks that often trigger migraines. These include chocolate, alcohol (especially red wine and port), aspartame, monosodium glutamate (MSG), and some additives found in foods (such as hot dogs, suarez, cold cuts, aged cheeses, and pickled foods). · Limit caffeine. Don't drink too much coffee, tea, or soda. But don't quit caffeine suddenly. That can also give you migraines. · Do not smoke or allow others to smoke around you. If you need help quitting, talk to your doctor about stop-smoking programs and medicines. These can increase your chances of quitting for good.   · If you are taking birth control pills or hormone therapy, talk to your doctor about whether they are triggering your migraines. When should you call for help? Call 911 anytime you think you may need emergency care. For example, call if:  ? · You have signs of a stroke. These may include:  ¨ Sudden numbness, paralysis, or weakness in your face, arm, or leg, especially on only one side of your body. ¨ Sudden vision changes. ¨ Sudden trouble speaking. ¨ Sudden confusion or trouble understanding simple statements. ¨ Sudden problems with walking or balance. ¨ A sudden, severe headache that is different from past headaches. ?Call your doctor now or seek immediate medical care if:  ? · You have new or worse nausea and vomiting. ? · You have a new or higher fever. ? · Your headache gets much worse. ? Watch closely for changes in your health, and be sure to contact your doctor if:  ? · You are not getting better after 2 days (48 hours). Where can you learn more? Go to http://keely-kacie.info/. Enter U439 in the search box to learn more about \"Migraine Headache: Care Instructions. \"  Current as of: October 14, 2016  Content Version: 11.4  © 6988-9476 [employer]. Care instructions adapted under license by [employer] (which disclaims liability or warranty for this information). If you have questions about a medical condition or this instruction, always ask your healthcare professional. [place] [address] any warranty or liability for your use of this information.

## 2018-02-01 NOTE — PROGRESS NOTES
HISTORY OF PRESENT ILLNESS  Fredo Cedillo is a 41 y.o. female presents for acute visit  HPI  Steady right side headache since yesterday AM     Taking Topamax daily as prescribe for chronic migraine. No recent use of prn migraine medication. Believes headaches triggered by stress     + nausea, photophobia,     No neuro evaluation     Past Medical History:   Diagnosis Date    Anemia NEC     on fe pill    Fibroid, uterine     Migraine     Postpartum depression     years ago       Current Outpatient Prescriptions on File Prior to Visit   Medication Sig Dispense Refill    multivitamin (ONE A DAY) tablet Take 1 Tab by mouth daily.  topiramate (TOPAMAX) 50 mg tablet TAKE 1 TABLET BY MOUTH TWICE A  Tab 0    polyethylene glycol (MIRALAX) 17 gram/dose powder Take 17 g by mouth daily. 1 tablespoon with 8 oz of water daily 238 g 0     No current facility-administered medications on file prior to visit. Review of Systems   Constitutional: Negative for chills and fever. HENT: Negative for congestion. Eyes: Positive for photophobia. Respiratory: Negative. Cardiovascular: Negative. Gastrointestinal: Positive for nausea. Genitourinary: Negative. Neurological: Positive for headaches. Physical Exam   Constitutional: She is oriented to person, place, and time. She appears well-developed and well-nourished. No distress. HENT:   Head: Normocephalic and atraumatic. Right Ear: External ear normal.   Left Ear: External ear normal.   Nose: Nose normal.   Mouth/Throat: No oropharyngeal exudate. Eyes: Conjunctivae and EOM are normal. Pupils are equal, round, and reactive to light. Right eye exhibits no discharge. Left eye exhibits no discharge. Neck: Normal range of motion. Neck supple. Cardiovascular: Normal rate, regular rhythm and normal heart sounds.     Pulmonary/Chest: Effort normal and breath sounds normal.   Musculoskeletal: She exhibits no edema, tenderness or deformity. Lymphadenopathy:     She has no cervical adenopathy. Neurological: She is alert and oriented to person, place, and time. Skin: She is not diaphoretic. ASSESSMENT and PLAN    ICD-10-CM ICD-9-CM    1. Migraine without aura and with status migrainosus, not intractable G43.001 346.12 SUMAtriptan (IMITREX) 100 mg tablet      REFERRAL TO NEUROLOGY   2. Headache due to viral infection B34.9 079.99 AMB POC CRISSY INFLUENZA A/B TEST    R51 784.0      Follow-up Disposition: Not on File  reviewed diet, exercise and weight control  reviewed medications and side effects in detail    Negative rapid flu    Patient voices understanding and acceptance of this advice and will call back if any further questions or concerns. An After Visit Summary was printed and given to the patient.

## 2018-02-27 DIAGNOSIS — G43.909 MIGRAINE WITHOUT STATUS MIGRAINOSUS, NOT INTRACTABLE, UNSPECIFIED MIGRAINE TYPE: ICD-10-CM

## 2018-02-27 RX ORDER — TOPIRAMATE 50 MG/1
TABLET, FILM COATED ORAL
Qty: 180 TAB | Refills: 0 | Status: SHIPPED | OUTPATIENT
Start: 2018-02-27 | End: 2018-07-17 | Stop reason: SDUPTHER

## 2018-04-17 ENCOUNTER — TELEPHONE (OUTPATIENT)
Dept: INTERNAL MEDICINE CLINIC | Age: 38
End: 2018-04-17

## 2018-04-17 NOTE — TELEPHONE ENCOUNTER
Spoke to patient in regards to hemorrhoids. Patient stated there's a lot of blood in the toilet and when she wipes. Patient isn't experiencing any other symptoms. Patient offered an appointment tomorrow, 4/18/17 at 9 am and accepted the appointment. Writer informed patient if she experiences any dizziness, feel faint or color change to blood. Patient will need to go to the nearest ER. Patient agreed. Patient given an opportunity to ask questions, repeated information, and verbalized understanding.

## 2018-04-18 ENCOUNTER — OFFICE VISIT (OUTPATIENT)
Dept: INTERNAL MEDICINE CLINIC | Age: 38
End: 2018-04-18

## 2018-04-18 VITALS
WEIGHT: 214 LBS | BODY MASS INDEX: 34.39 KG/M2 | DIASTOLIC BLOOD PRESSURE: 73 MMHG | HEART RATE: 54 BPM | OXYGEN SATURATION: 100 % | RESPIRATION RATE: 16 BRPM | TEMPERATURE: 97.8 F | SYSTOLIC BLOOD PRESSURE: 123 MMHG | HEIGHT: 66 IN

## 2018-04-18 DIAGNOSIS — R73.01 IFG (IMPAIRED FASTING GLUCOSE): ICD-10-CM

## 2018-04-18 DIAGNOSIS — D64.9 ANEMIA, UNSPECIFIED TYPE: ICD-10-CM

## 2018-04-18 DIAGNOSIS — G43.001 MIGRAINE WITHOUT AURA AND WITH STATUS MIGRAINOSUS, NOT INTRACTABLE: ICD-10-CM

## 2018-04-18 DIAGNOSIS — R51.9 CHRONIC NONINTRACTABLE HEADACHE, UNSPECIFIED HEADACHE TYPE: ICD-10-CM

## 2018-04-18 DIAGNOSIS — G89.29 CHRONIC NONINTRACTABLE HEADACHE, UNSPECIFIED HEADACHE TYPE: ICD-10-CM

## 2018-04-18 DIAGNOSIS — K62.5 RECTAL BLEED: Primary | ICD-10-CM

## 2018-04-18 DIAGNOSIS — R53.83 FATIGUE, UNSPECIFIED TYPE: ICD-10-CM

## 2018-04-18 RX ORDER — HYDROCORTISONE 25 MG/G
CREAM TOPICAL 4 TIMES DAILY
Qty: 30 G | Refills: 0 | Status: SHIPPED | OUTPATIENT
Start: 2018-04-18

## 2018-04-18 RX ORDER — SUMATRIPTAN 100 MG/1
100 TABLET, FILM COATED ORAL
Qty: 15 TAB | Refills: 0 | Status: SHIPPED | OUTPATIENT
Start: 2018-04-18 | End: 2019-01-23 | Stop reason: SDUPTHER

## 2018-04-18 NOTE — MR AVS SNAPSHOT
[address] [facility] [place] [facility] [address] 
[phone] Patient: Angel Inman MRN: [id number] Visit Information Date & Time Provider Department Dept. Phone Encounter #  
 4/18/2018  9:00 AM Troy Fish [id number] Pediatrics [facility] [phone] [id number] Follow-up Instructions Return if symptoms worsen or fail to improve. Upcoming Health Maintenance Date Due Influenza Age 5 to Adult 8/1/2017 PAP AKA CERVICAL CYTOLOGY 2/18/2018 DTaP/Tdap/Td series (2 - Td) 3/10/2027 Allergies as of 4/18/2018  Review Complete On: 4/18/2018 By: Benton Hawkins NP Severity Noted Reaction Type Reactions Penicillin G  12/15/2012    Rash Current Immunizations  Reviewed on 3/24/2014 Name Date Influenza Vaccine (Quad) PF  Incomplete MMR 7/27/2013  1:35 PM  
 TB Skin Test (PPD) Intradermal 3/21/2014 Not reviewed this visit You Were Diagnosed With   
  
 Codes Comments IFG (impaired fasting glucose)    -  Primary ICD-10-CM: R73.01 
ICD-9-CM: 790.21 Anemia, unspecified type     ICD-10-CM: D64.9 ICD-9-CM: 079. 9 Chronic nonintractable headache, unspecified headache type     ICD-10-CM: R51 ICD-9-CM: 784.0 Migraine without aura and with status migrainosus, not intractable     ICD-10-CM: G43.001 ICD-9-CM: 346.12 Rectal bleed     ICD-10-CM: K62.5 ICD-9-CM: 569.3 Fatigue, unspecified type     ICD-10-CM: R53.83 ICD-9-CM: 780.79 Vitals BP Pulse Temp Resp Height(growth percentile) Weight(growth percentile) 123/73 (BP 1 Location: Right arm, BP Patient Position: Sitting) (!) 54 97.8 °F (36.6 °C) (Oral) 16 5' 5.98\" (1.676 m) 214 lb (97.1 kg) LMP SpO2 BMI OB Status Smoking Status 09/17/2017 100% 34.56 kg/m2 Hysterectomy Current Every Day Smoker BMI and BSA Data Body Mass Index Body Surface Area  34.56 kg/m 2 2.13 m 2  
  
  
 Preferred Pharmacy Pharmacy Name Phone [facility]/PHARMACY #[id number]- [facility], [address] [facility] [phone] Your Updated Medication List  
  
   
This list is accurate as of 4/18/18 10:02 AM.  Always use your most recent med list.  
  
  
  
  
 hydrocortisone 2.5 % rectal cream  
Commonly known as:  ANUSOL-HC Insert  into rectum four (4) times daily. SUMAtriptan 100 mg tablet Commonly known as:  IMITREX Take 1 Tab by mouth once as needed for Migraine for up to 1 dose. May repeat after 2 hours if not better, maximum 2 tablets in 24 hours  Indications: Migraine  
  
 topiramate 50 mg tablet Commonly known as:  TOPAMAX TAKE 1 TABLET BY MOUTH TWICE A DAY Prescriptions Printed Refills SUMAtriptan (IMITREX) 100 mg tablet 0 Sig: Take 1 Tab by mouth once as needed for Migraine for up to 1 dose. May repeat after 2 hours if not better, maximum 2 tablets in 24 hours  Indications: Migraine Class: Print Route: Oral  
  
Prescriptions Sent to Pharmacy Refills  
 hydrocortisone (ANUSOL-HC) 2.5 % rectal cream 0 Sig: Insert  into rectum four (4) times daily. Class: Normal  
 Pharmacy: [facility]/pharmacy #[id number]- [place], [address] [place] [address] Ph #: [phone] Route: Rectal  
  
We Performed the Following AMB POC URINALYSIS DIP STICK AUTO W/O MICRO [62396 CPT(R)] CBC WITH AUTOMATED DIFF [56660 CPT(R)] FERRITIN [22903 CPT(R)] HEMOGLOBIN A1C WITH EAG [21718 CPT(R)] IRON PROFILE [id number] CPT(R)] METABOLIC PANEL, COMPREHENSIVE [55687 CPT(R)] REFERRAL TO COLON AND RECTAL SURGERY [REF17 Custom] REFERRAL TO NEUROLOGY [ECD72 Custom] Follow-up Instructions Return if symptoms worsen or fail to improve. Referral Information Referral ID Referred By Referred To  
  
 [id number] Santiago Anand MD   
   [address] [facility] [address] [place] [address], [address] Phone: [phone] Fax: [phone] Visits Status Start Date End Date 1 New Request 4/18/18 4/18/19 If your referral has a status of pending review or denied, additional information will be sent to support the outcome of this decision. Referral ID Referred By Referred To  
 [id number] [facility]  
   [address] [facility] [address] [facility], [address] Visits Status Start Date End Date 1 New Request 4/18/18 4/18/19 If your referral has a status of pending review or denied, additional information will be sent to support the outcome of this decision. Patient Instructions Fatigue: Care Instructions Your Care Instructions Fatigue is a feeling of tiredness, exhaustion, or lack of energy. You may feel fatigue because of too much or not enough activity. It can also come from stress, lack of sleep, boredom, and poor diet. Many medical problems, such as viral infections, can cause fatigue. Emotional problems, especially depression, are often the cause of fatigue. Fatigue is most often a symptom of another problem. Treatment for fatigue depends on the cause. For example, if you have fatigue because you have a certain health problem, treating this problem also treats your fatigue. If depression or anxiety is the cause, treatment may help. Follow-up care is a key part of your treatment and safety. Be sure to make and go to all appointments, and call your doctor if you are having problems. It's also a good idea to know your test results and keep a list of the medicines you take. How can you care for yourself at home? · Get regular exercise. But don't overdo it. Go back and forth between rest and exercise. · Get plenty of rest. 
· Eat a healthy diet. Do not skip meals, especially breakfast. 
· Reduce your use of caffeine, tobacco, and alcohol.  Caffeine is most often found in coffee, tea, cola drinks, and chocolate. · Limit medicines that can cause fatigue. This includes tranquilizers and cold and allergy medicines. When should you call for help? Watch closely for changes in your health, and be sure to contact your doctor if: 
? · You have new symptoms such as fever or a rash. ? · Your fatigue gets worse. ? · You have been feeling down, depressed, or hopeless. Or you may have lost interest in things that you usually enjoy. ? · You are not getting better as expected. Where can you learn more? Go to http://keely-kacie.info/. Enter L785 in the search box to learn more about \"Fatigue: Care Instructions. \" Current as of: March 20, 2017 Content Version: 11.4 © 0608-3484 [employer]. Care instructions adapted under license by [employer] (which disclaims liability or warranty for this information). If you have questions about a medical condition or this instruction, always ask your healthcare professional. [place] [address] any warranty or liability for your use of this information. Anemia: Care Instructions Your Care Instructions Anemia is a low level of red blood cells, which carry oxygen throughout your body. Many things can cause anemia. Lack of iron is one of the most common causes. Your body needs iron to make hemoglobin, a substance in red blood cells that carries oxygen from the lungs to your body's cells. Without enough iron, the body produces fewer and smaller red blood cells. As a result, your body's cells do not get enough oxygen, and you feel tired and weak. And you may have trouble concentrating. Bleeding is the most common cause of a lack of iron. You may have heavy menstrual bleeding or bleeding caused by conditions such as ulcers, hemorrhoids, or cancer. Regular use of aspirin or other anti-inflammatory medicines (such as ibuprofen) also can cause bleeding in some people.  A lack of iron in your diet also can cause anemia, especially at times when the body needs more iron, such as during pregnancy, infancy, and the teen years. Your doctor may have prescribed iron pills. It may take several months of treatment for your iron levels to return to normal. Your doctor also may suggest that you eat foods that are rich in iron, such as meat and beans. There are many other causes of anemia. It is not always due to a lack of iron. Finding the specific cause of your anemia will help your doctor find the right treatment for you. Follow-up care is a key part of your treatment and safety. Be sure to make and go to all appointments, and call your doctor if you are having problems. It's also a good idea to know your test results and keep a list of the medicines you take. How can you care for yourself at home? · Take your medicines exactly as prescribed. Call your doctor if you think you are having a problem with your medicine. · If your doctor recommends iron pills, take them as directed: ¨ Try to take the pills on an empty stomach about 1 hour before or 2 hours after meals. But you may need to take iron with food to avoid an upset stomach. ¨ Do not take antacids or drink milk or caffeine drinks (such as coffee, tea, or cola) at the same time or within 2 hours of the time that you take your iron. They can make it hard for your body to absorb the iron. ¨ Vitamin C (from food or supplements) helps your body absorb iron. Try taking iron pills with a glass of orange juice or some other food that is high in vitamin C, such as citrus fruits. ¨ Iron pills may cause stomach problems, such as heartburn, nausea, diarrhea, constipation, and cramps. Be sure to drink plenty of fluids, and include fruits, vegetables, and fiber in your diet each day. Iron pills often make your bowel movements dark or green.  
¨ If you forget to take an iron pill, do not take a double dose of iron the next time you take a pill. ¨ Keep iron pills out of the reach of small children. An overdose of iron can be very dangerous. · Follow your doctor's advice about eating iron-rich foods. These include red meat, shellfish, poultry, eggs, beans, raisins, whole-grain bread, and leafy green vegetables. · Steam vegetables to help them keep their iron content. When should you call for help? Call 911 anytime you think you may need emergency care. For example, call if: 
? · You have symptoms of a heart attack. These may include: ¨ Chest pain or pressure, or a strange feeling in the chest. 
¨ Sweating. ¨ Shortness of breath. ¨ Nausea or vomiting. ¨ Pain, pressure, or a strange feeling in the back, neck, jaw, or upper belly or in one or both shoulders or arms. ¨ Lightheadedness or sudden weakness. ¨ A fast or irregular heartbeat. After you call 911, the  may tell you to chew 1 adult-strength or 2 to 4 low-dose aspirin. Wait for an ambulance. Do not try to drive yourself. ? · You passed out (lost consciousness). ?Call your doctor now or seek immediate medical care if: 
? · You have new or increased shortness of breath. ? · You are dizzy or lightheaded, or you feel like you may faint. ? · Your fatigue and weakness continue or get worse. ? · You have any abnormal bleeding, such as: 
¨ Nosebleeds. ¨ Vaginal bleeding that is different (heavier, more frequent, at a different time of the month) than what you are used to. ¨ Bloody or black stools, or rectal bleeding. ¨ Bloody or pink urine. ? Watch closely for changes in your health, and be sure to contact your doctor if: 
? · You do not get better as expected. Where can you learn more? Go to http://keely-kacie.info/. Enter R301 in the search box to learn more about \"Anemia: Care Instructions. \" Current as of: October 13, 2016 Content Version: 11.4 © 1519-2856 Healthwise, [facility].  Care instructions adapted under license by [address] (which disclaims liability or warranty for this information). If you have questions about a medical condition or this instruction, always ask your healthcare professional. [address] any warranty or liability for your use of this information. Migraine Headache: Care Instructions Your Care Instructions Migraines are painful, throbbing headaches that often start on one side of the head. They may cause nausea and vomiting and make you sensitive to light, sound, or smell. Without treatment, migraines can last from 4 hours to a few days. Medicines can help prevent migraines or stop them after they have started. Your doctor can help you find which ones work best for you. Follow-up care is a key part of your treatment and safety. Be sure to make and go to all appointments, and call your doctor if you are having problems. It's also a good idea to know your test results and keep a list of the medicines you take. How can you care for yourself at home? · Do not drive if you have taken a prescription pain medicine. · Rest in a quiet, dark room until your headache is gone. Close your eyes, and try to relax or go to sleep. Don't watch TV or read. · Put a cold, moist cloth or cold pack on the painful area for 10 to 20 minutes at a time. Put a thin cloth between the cold pack and your skin. · Use a warm, moist towel or a heating pad set on low to relax tight shoulder and neck muscles. · Have someone gently massage your neck and shoulders. · Take your medicines exactly as prescribed. Call your doctor if you think you are having a problem with your medicine. You will get more details on the specific medicines your doctor prescribes. · Be careful not to take pain medicine more often than the instructions allow. You could get worse or more frequent headaches when the medicine wears off. To prevent migraines · Keep a headache diary so you can figure out what triggers your headaches. Avoiding triggers may help you prevent headaches. Record when each headache began, how long it lasted, and what the pain was like. (Was it throbbing, aching, stabbing, or dull?) Write down any other symptoms you had with the headache, such as nausea, flashing lights or dark spots, or sensitivity to bright light or loud noise. Note if the headache occurred near your period. List anything that might have triggered the headache. Triggers may include certain foods (chocolate, cheese, wine) or odors, smoke, bright light, stress, or lack of sleep. · If your doctor has prescribed medicine for your migraines, take it as directed. You may have medicine that you take only when you get a migraine and medicine that you take all the time to help prevent migraines. ¨ If your doctor has prescribed medicine for when you get a headache, take it at the first sign of a migraine, unless your doctor has given you other instructions. ¨ If your doctor has prescribed medicine to prevent migraines, take it exactly as prescribed. Call your doctor if you think you are having a problem with your medicine. · Find healthy ways to deal with stress. Migraines are most common during or right after stressful times. Take time to relax before and after you do something that has caused a migraine in the past. 
· Try to keep your muscles relaxed by keeping good posture. Check your jaw, face, neck, and shoulder muscles for tension. Try to relax them. When you sit at a desk, change positions often. And make sure to stretch for 30 seconds each hour. · Get plenty of sleep and exercise. · Eat meals on a regular schedule. Avoid foods and drinks that often trigger migraines.  These include chocolate, alcohol (especially red wine and port), aspartame, monosodium glutamate (MSG), and some additives found in foods (such as hot dogs, suarez, cold cuts, aged cheeses, and pickled foods). · Limit caffeine. Don't drink too much coffee, tea, or soda. But don't quit caffeine suddenly. That can also give you migraines. · Do not smoke or allow others to smoke around you. If you need help quitting, talk to your doctor about stop-smoking programs and medicines. These can increase your chances of quitting for good. · If you are taking birth control pills or hormone therapy, talk to your doctor about whether they are triggering your migraines. When should you call for help? Call 911 anytime you think you may need emergency care. For example, call if: 
? · You have signs of a stroke. These may include: 
¨ Sudden numbness, paralysis, or weakness in your face, arm, or leg, especially on only one side of your body. ¨ Sudden vision changes. ¨ Sudden trouble speaking. ¨ Sudden confusion or trouble understanding simple statements. ¨ Sudden problems with walking or balance. ¨ A sudden, severe headache that is different from past headaches. ?Call your doctor now or seek immediate medical care if: 
? · You have new or worse nausea and vomiting. ? · You have a new or higher fever. ? · Your headache gets much worse. ? Watch closely for changes in your health, and be sure to contact your doctor if: 
? · You are not getting better after 2 days (48 hours). Where can you learn more? Go to http://keely-kacie.info/. Enter F167 in the search box to learn more about \"Migraine Headache: Care Instructions. \" Current as of: October 14, 2016 Content Version: 11.4 © 0124-4634 Healthwise, Incorporated. Care instructions adapted under license by [employer] (which disclaims liability or warranty for this information). If you have questions about a medical condition or this instruction, always ask your healthcare professional. [address] any warranty or liability for your use of this information. Rectal Bleeding: Care Instructions Your Care Instructions Rectal bleeding in small amounts is common. You may see red spotting on toilet paper or drops of blood in the toilet. Rectal bleeding has many possible causes, from something as minor as hemorrhoids to something as serious as colon cancer. You may need more tests to find the cause of your bleeding. Follow-up care is a key part of your treatment and safety. Be sure to make and go to all appointments, and call your doctor if you are having problems. It's also a good idea to know your test results and keep a list of the medicines you take. How can you care for yourself at home? · Avoid aspirin and other nonsteroidal anti-inflammatory drugs (NSAIDs), such as ibuprofen (Advil, Motrin) and naproxen (Aleve). They can cause you to bleed more. Ask your doctor if you can take acetaminophen (Tylenol). Read and follow all instructions on the label. · Use a stool softener that contains bran or psyllium. You can save money by buying bran or psyllium (available in bulk at most health food stores) and sprinkling it on foods or stirring it into fruit juice. You can also use a product such as Metamucil or Citrucel. · Take your medicines exactly as directed. Call your doctor if you think you are having a problem with your medicine. When should you call for help? Call 911 anytime you think you may need emergency care. For example, call if: 
? · You passed out (lost consciousness). ?Call your doctor now or seek immediate medical care if: 
? · You have new or worse pain. ? · You have new or worse bleeding from the rectum. ? · You are dizzy or light-headed, or you feel like you may faint. ? Watch closely for changes in your health, and be sure to contact your doctor if: 
? · You cannot pass stools or gas. ? · You do not get better as expected. Where can you learn more? Go to http://ekely-kacie.info/.  
Enter Z289 in the search box to learn more about \"Rectal Bleeding: Care Instructions. \" Current as of: May 12, 2017 Content Version: 11.4 © 6229-1939 [employer]er. Care instructions adapted under license by [employer] (which disclaims liability or warranty for this information). If you have questions about a medical condition or this instruction, always ask your healthcare professional. [address] any warranty or liability for your use of this information. Hemorrhoids: Care Instructions Your Care Instructions Hemorrhoids are enlarged veins that develop in the anal canal. Bleeding during bowel movements, itching, swelling, and rectal pain are the most common symptoms. They can be uncomfortable at times, but hemorrhoids rarely are a serious problem. You can treat most hemorrhoids with simple changes to your diet and bowel habits. These changes include eating more fiber and not straining to pass stools. Most hemorrhoids do not need surgery or other treatment unless they are very large and painful or bleed a lot. Follow-up care is a key part of your treatment and safety. Be sure to make and go to all appointments, and call your doctor if you are having problems. It's also a good idea to know your test results and keep a list of the medicines you take. How can you care for yourself at home? · Sit in a few inches of warm water (sitz bath) 3 times a day and after bowel movements. The warm water helps with pain and itching. · Put ice on your anal area several times a day for 10 minutes at a time. Put a thin cloth between the ice and your skin. Follow this by placing a warm, wet towel on the area for another 10 to 20 minutes. · Take pain medicines exactly as directed. ¨ If the doctor gave you a prescription medicine for pain, take it as prescribed. ¨ If you are not taking a prescription pain medicine, ask your doctor if you can take an over-the-counter medicine. · Keep the anal area clean, but be gentle. Use water and a fragrance-free soap, such as [place], or use baby wipes or medicated pads, such as Tucks. · Wear cotton underwear and loose clothing to decrease moisture in the anal area. · Eat more fiber. Include foods such as whole-grain breads and cereals, raw vegetables, raw and dried fruits, and beans. · Drink plenty of fluids, enough so that your urine is light yellow or clear like water. If you have kidney, heart, or liver disease and have to limit fluids, talk with your doctor before you increase the amount of fluids you drink. · Use a stool softener that contains bran or psyllium. You can save money by buying bran or psyllium (available in bulk at most health food stores) and sprinkling it on foods or stirring it into fruit juice. Or you can use a product such as Metamucil or Hydrocil. · Practice healthy bowel habits. ¨ Go to the bathroom as soon as you have the urge. ¨ Avoid straining to pass stools. Relax and give yourself time to let things happen naturally. ¨ Do not hold your breath while passing stools. ¨ Do not read while sitting on the toilet. Get off the toilet as soon as you have finished. · Take your medicines exactly as prescribed. Call your doctor if you think you are having a problem with your medicine. When should you call for help? Call 911 anytime you think you may need emergency care. For example, call if: 
? · You pass maroon or very bloody stools. ?Call your doctor now or seek immediate medical care if: 
? · You have increased pain. ? · You have increased bleeding. ? Watch closely for changes in your health, and be sure to contact your doctor if: 
? · Your symptoms have not improved after 3 or 4 days. Where can you learn more? Go to http://keely-kacie.info/. Enter F228 in the search box to learn more about \"Hemorrhoids: Care Instructions. \" Current as of: May 12, 2017 Content Version: 11.4 © 0248-4396 [employer]. Care instructions adapted under license by [employer] (which disclaims liability or warranty for this information). If you have questions about a medical condition or this instruction, always ask your healthcare professional. [address] any warranty or liability for your use of this information. Hemorrhoids: Care Instructions Your Care Instructions Hemorrhoids are enlarged veins that develop in the anal canal. Bleeding during bowel movements, itching, swelling, and rectal pain are the most common symptoms. They can be uncomfortable at times, but hemorrhoids rarely are a serious problem. You can treat most hemorrhoids with simple changes to your diet and bowel habits. These changes include eating more fiber and not straining to pass stools. Most hemorrhoids do not need surgery or other treatment unless they are very large and painful or bleed a lot. Follow-up care is a key part of your treatment and safety. Be sure to make and go to all appointments, and call your doctor if you are having problems. It's also a good idea to know your test results and keep a list of the medicines you take. How can you care for yourself at home? · Sit in a few inches of warm water (sitz bath) 3 times a day and after bowel movements. The warm water helps with pain and itching. · Put ice on your anal area several times a day for 10 minutes at a time. Put a thin cloth between the ice and your skin. Follow this by placing a warm, wet towel on the area for another 10 to 20 minutes. · Take pain medicines exactly as directed. ¨ If the doctor gave you a prescription medicine for pain, take it as prescribed. ¨ If you are not taking a prescription pain medicine, ask your doctor if you can take an over-the-counter medicine. · Keep the anal area clean, but be gentle.  Use water and a fragrance-free soap, such as [place], or use baby wipes or medicated pads, such as Tucks. · Wear cotton underwear and loose clothing to decrease moisture in the anal area. · Eat more fiber. Include foods such as whole-grain breads and cereals, raw vegetables, raw and dried fruits, and beans. · Drink plenty of fluids, enough so that your urine is light yellow or clear like water. If you have kidney, heart, or liver disease and have to limit fluids, talk with your doctor before you increase the amount of fluids you drink. · Use a stool softener that contains bran or psyllium. You can save money by buying bran or psyllium (available in bulk at most health food stores) and sprinkling it on foods or stirring it into fruit juice. Or you can use a product such as Metamucil or Hydrocil. · Practice healthy bowel habits. ¨ Go to the bathroom as soon as you have the urge. ¨ Avoid straining to pass stools. Relax and give yourself time to let things happen naturally. ¨ Do not hold your breath while passing stools. ¨ Do not read while sitting on the toilet. Get off the toilet as soon as you have finished. · Take your medicines exactly as prescribed. Call your doctor if you think you are having a problem with your medicine. When should you call for help? Call 911 anytime you think you may need emergency care. For example, call if: 
? · You pass maroon or very bloody stools. ?Call your doctor now or seek immediate medical care if: 
? · You have increased pain. ? · You have increased bleeding. ? Watch closely for changes in your health, and be sure to contact your doctor if: 
? · Your symptoms have not improved after 3 or 4 days. Where can you learn more? Go to http://keely-kacie.info/. Enter F228 in the search box to learn more about \"Hemorrhoids: Care Instructions. \" Current as of: May 12, 2017 Content Version: 11.4 © 1192-9112 [employer].  Care instructions adapted under license by [address] [facility] (which disclaims liability or warranty for this information). If you have questions about a medical condition or this instruction, always ask your healthcare professional. [address] any warranty or liability for your use of this information. Introducing [address]! Francine Peña introduces [employer] patient portal. Now you can access parts of your medical record, email your doctor's office, and request medication refills online. 1. In your internet browser, go to [email or web address]. [employer] 2. Click on the First Time User? Click Here link in the Sign In box. You will see the New Member Sign Up page. 3. Enter your [employer] Access Code exactly as it appears below. You will not need to use this code after youve completed the sign-up process. If you do not sign up before the expiration date, you must request a new code. · [employer] Access Code: [email or web address] Expires: 7/17/2018  9:23 AM 
 
4. Enter the last four digits of your Social Security Number (xxxx) and Date of Birth (mm/dd/yyyy) as indicated and click Submit. You will be taken to the next sign-up page. 5. Create a [employer] ID. This will be your [employer] login ID and cannot be changed, so think of one that is secure and easy to remember. 6. Create a [employer] password. You can change your password at any time. 7. Enter your Password Reset Question and Answer. This can be used at a later time if you forget your password. 8. Enter your e-mail address. You will receive e-mail notification when new information is available in [address] 9. Click Sign Up. You can now view and download portions of your medical record. 10. Click the Download Summary menu link to download a portable copy of your medical information. If you have questions, please visit the Frequently Asked Questions section of the [employer] website.  Remember, [employer] is NOT to be used for urgent needs. For medical emergencies, dial 911. Now available from your iPhone and Android! Please provide this summary of care documentation to your next provider. Your primary care clinician is listed as Anatoly Santos. If you have any questions after today's visit, please call [phone].

## 2018-04-18 NOTE — PATIENT INSTRUCTIONS
Fatigue: Care Instructions  Your Care Instructions    Fatigue is a feeling of tiredness, exhaustion, or lack of energy. You may feel fatigue because of too much or not enough activity. It can also come from stress, lack of sleep, boredom, and poor diet. Many medical problems, such as viral infections, can cause fatigue. Emotional problems, especially depression, are often the cause of fatigue. Fatigue is most often a symptom of another problem. Treatment for fatigue depends on the cause. For example, if you have fatigue because you have a certain health problem, treating this problem also treats your fatigue. If depression or anxiety is the cause, treatment may help. Follow-up care is a key part of your treatment and safety. Be sure to make and go to all appointments, and call your doctor if you are having problems. It's also a good idea to know your test results and keep a list of the medicines you take. How can you care for yourself at home? · Get regular exercise. But don't overdo it. Go back and forth between rest and exercise. · Get plenty of rest.  · Eat a healthy diet. Do not skip meals, especially breakfast.  · Reduce your use of caffeine, tobacco, and alcohol. Caffeine is most often found in coffee, tea, cola drinks, and chocolate. · Limit medicines that can cause fatigue. This includes tranquilizers and cold and allergy medicines. When should you call for help? Watch closely for changes in your health, and be sure to contact your doctor if:  ? · You have new symptoms such as fever or a rash. ? · Your fatigue gets worse. ? · You have been feeling down, depressed, or hopeless. Or you may have lost interest in things that you usually enjoy. ? · You are not getting better as expected. Where can you learn more? Go to http://keely-kacie.info/. Enter V422 in the search box to learn more about \"Fatigue: Care Instructions. \"  Current as of: March 20, 2017  Content Version: 11.4  © 5872-5455 [employer]. Care instructions adapted under license by [employer] (which disclaims liability or warranty for this information). If you have questions about a medical condition or this instruction, always ask your healthcare professional. [address] any warranty or liability for your use of this information. Anemia: Care Instructions  Your Care Instructions    Anemia is a low level of red blood cells, which carry oxygen throughout your body. Many things can cause anemia. Lack of iron is one of the most common causes. Your body needs iron to make hemoglobin, a substance in red blood cells that carries oxygen from the lungs to your body's cells. Without enough iron, the body produces fewer and smaller red blood cells. As a result, your body's cells do not get enough oxygen, and you feel tired and weak. And you may have trouble concentrating. Bleeding is the most common cause of a lack of iron. You may have heavy menstrual bleeding or bleeding caused by conditions such as ulcers, hemorrhoids, or cancer. Regular use of aspirin or other anti-inflammatory medicines (such as ibuprofen) also can cause bleeding in some people. A lack of iron in your diet also can cause anemia, especially at times when the body needs more iron, such as during pregnancy, infancy, and the teen years. Your doctor may have prescribed iron pills. It may take several months of treatment for your iron levels to return to normal. Your doctor also may suggest that you eat foods that are rich in iron, such as meat and beans. There are many other causes of anemia. It is not always due to a lack of iron. Finding the specific cause of your anemia will help your doctor find the right treatment for you. Follow-up care is a key part of your treatment and safety. Be sure to make and go to all appointments, and call your doctor if you are having problems.  It's also a good idea to know your test results and keep a list of the medicines you take. How can you care for yourself at home? · Take your medicines exactly as prescribed. Call your doctor if you think you are having a problem with your medicine. · If your doctor recommends iron pills, take them as directed:  ¨ Try to take the pills on an empty stomach about 1 hour before or 2 hours after meals. But you may need to take iron with food to avoid an upset stomach. ¨ Do not take antacids or drink milk or caffeine drinks (such as coffee, tea, or cola) at the same time or within 2 hours of the time that you take your iron. They can make it hard for your body to absorb the iron. ¨ Vitamin C (from food or supplements) helps your body absorb iron. Try taking iron pills with a glass of orange juice or some other food that is high in vitamin C, such as citrus fruits. ¨ Iron pills may cause stomach problems, such as heartburn, nausea, diarrhea, constipation, and cramps. Be sure to drink plenty of fluids, and include fruits, vegetables, and fiber in your diet each day. Iron pills often make your bowel movements dark or green. ¨ If you forget to take an iron pill, do not take a double dose of iron the next time you take a pill. ¨ Keep iron pills out of the reach of small children. An overdose of iron can be very dangerous. · Follow your doctor's advice about eating iron-rich foods. These include red meat, shellfish, poultry, eggs, beans, raisins, whole-grain bread, and leafy green vegetables. · Steam vegetables to help them keep their iron content. When should you call for help? Call 911 anytime you think you may need emergency care. For example, call if:  ? · You have symptoms of a heart attack. These may include:  ¨ Chest pain or pressure, or a strange feeling in the chest.  ¨ Sweating. ¨ Shortness of breath. ¨ Nausea or vomiting.   ¨ Pain, pressure, or a strange feeling in the back, neck, jaw, or upper belly or in one or both shoulders or arms.  ¨ Lightheadedness or sudden weakness. ¨ A fast or irregular heartbeat. After you call 911, the  may tell you to chew 1 adult-strength or 2 to 4 low-dose aspirin. Wait for an ambulance. Do not try to drive yourself. ? · You passed out (lost consciousness). ?Call your doctor now or seek immediate medical care if:  ? · You have new or increased shortness of breath. ? · You are dizzy or lightheaded, or you feel like you may faint. ? · Your fatigue and weakness continue or get worse. ? · You have any abnormal bleeding, such as:  ¨ Nosebleeds. ¨ Vaginal bleeding that is different (heavier, more frequent, at a different time of the month) than what you are used to. ¨ Bloody or black stools, or rectal bleeding. ¨ Bloody or pink urine. ? Watch closely for changes in your health, and be sure to contact your doctor if:  ? · You do not get better as expected. Where can you learn more? Go to http://keely-kacie.info/. Enter R301 in the search box to learn more about \"Anemia: Care Instructions. \"  Current as of: October 13, 2016  Content Version: 11.4  © 2359-3180 [employer]. Care instructions adapted under license by [employer] (which disclaims liability or warranty for this information). If you have questions about a medical condition or this instruction, always ask your healthcare professional. [place] [address] any warranty or liability for your use of this information. Migraine Headache: Care Instructions  Your Care Instructions  Migraines are painful, throbbing headaches that often start on one side of the head. They may cause nausea and vomiting and make you sensitive to light, sound, or smell. Without treatment, migraines can last from 4 hours to a few days. Medicines can help prevent migraines or stop them after they have started. Your doctor can help you find which ones work best for you.   Follow-up care is a key part of your treatment and safety. Be sure to make and go to all appointments, and call your doctor if you are having problems. It's also a good idea to know your test results and keep a list of the medicines you take. How can you care for yourself at home? · Do not drive if you have taken a prescription pain medicine. · Rest in a quiet, dark room until your headache is gone. Close your eyes, and try to relax or go to sleep. Don't watch TV or read. · Put a cold, moist cloth or cold pack on the painful area for 10 to 20 minutes at a time. Put a thin cloth between the cold pack and your skin. · Use a warm, moist towel or a heating pad set on low to relax tight shoulder and neck muscles. · Have someone gently massage your neck and shoulders. · Take your medicines exactly as prescribed. Call your doctor if you think you are having a problem with your medicine. You will get more details on the specific medicines your doctor prescribes. · Be careful not to take pain medicine more often than the instructions allow. You could get worse or more frequent headaches when the medicine wears off. To prevent migraines  · Keep a headache diary so you can figure out what triggers your headaches. Avoiding triggers may help you prevent headaches. Record when each headache began, how long it lasted, and what the pain was like. (Was it throbbing, aching, stabbing, or dull?) Write down any other symptoms you had with the headache, such as nausea, flashing lights or dark spots, or sensitivity to bright light or loud noise. Note if the headache occurred near your period. List anything that might have triggered the headache. Triggers may include certain foods (chocolate, cheese, wine) or odors, smoke, bright light, stress, or lack of sleep. · If your doctor has prescribed medicine for your migraines, take it as directed.  You may have medicine that you take only when you get a migraine and medicine that you take all the time to help prevent migraines. ¨ If your doctor has prescribed medicine for when you get a headache, take it at the first sign of a migraine, unless your doctor has given you other instructions. ¨ If your doctor has prescribed medicine to prevent migraines, take it exactly as prescribed. Call your doctor if you think you are having a problem with your medicine. · Find healthy ways to deal with stress. Migraines are most common during or right after stressful times. Take time to relax before and after you do something that has caused a migraine in the past.  · Try to keep your muscles relaxed by keeping good posture. Check your jaw, face, neck, and shoulder muscles for tension. Try to relax them. When you sit at a desk, change positions often. And make sure to stretch for 30 seconds each hour. · Get plenty of sleep and exercise. · Eat meals on a regular schedule. Avoid foods and drinks that often trigger migraines. These include chocolate, alcohol (especially red wine and port), aspartame, monosodium glutamate (MSG), and some additives found in foods (such as hot dogs, suarez, cold cuts, aged cheeses, and pickled foods). · Limit caffeine. Don't drink too much coffee, tea, or soda. But don't quit caffeine suddenly. That can also give you migraines. · Do not smoke or allow others to smoke around you. If you need help quitting, talk to your doctor about stop-smoking programs and medicines. These can increase your chances of quitting for good. · If you are taking birth control pills or hormone therapy, talk to your doctor about whether they are triggering your migraines. When should you call for help? Call 911 anytime you think you may need emergency care. For example, call if:  ? · You have signs of a stroke. These may include:  ¨ Sudden numbness, paralysis, or weakness in your face, arm, or leg, especially on only one side of your body. ¨ Sudden vision changes. ¨ Sudden trouble speaking.   ¨ Sudden confusion or trouble understanding simple statements. ¨ Sudden problems with walking or balance. ¨ A sudden, severe headache that is different from past headaches. ?Call your doctor now or seek immediate medical care if:  ? · You have new or worse nausea and vomiting. ? · You have a new or higher fever. ? · Your headache gets much worse. ? Watch closely for changes in your health, and be sure to contact your doctor if:  ? · You are not getting better after 2 days (48 hours). Where can you learn more? Go to http://keely-kacie.info/. Enter D384 in the search box to learn more about \"Migraine Headache: Care Instructions. \"  Current as of: October 14, 2016  Content Version: 11.4  © 6571-3760 [employer]. Care instructions adapted under license by [employer] (which disclaims liability or warranty for this information). If you have questions about a medical condition or this instruction, always ask your healthcare professional. [place] [address] any warranty or liability for your use of this information. Rectal Bleeding: Care Instructions  Your Care Instructions    Rectal bleeding in small amounts is common. You may see red spotting on toilet paper or drops of blood in the toilet. Rectal bleeding has many possible causes, from something as minor as hemorrhoids to something as serious as colon cancer. You may need more tests to find the cause of your bleeding. Follow-up care is a key part of your treatment and safety. Be sure to make and go to all appointments, and call your doctor if you are having problems. It's also a good idea to know your test results and keep a list of the medicines you take. How can you care for yourself at home? · Avoid aspirin and other nonsteroidal anti-inflammatory drugs (NSAIDs), such as ibuprofen (Advil, Motrin) and naproxen (Aleve). They can cause you to bleed more. Ask your doctor if you can take acetaminophen (Tylenol).  Read and follow all instructions on the label. · Use a stool softener that contains bran or psyllium. You can save money by buying bran or psyllium (available in bulk at most health food stores) and sprinkling it on foods or stirring it into fruit juice. You can also use a product such as Metamucil or Citrucel. · Take your medicines exactly as directed. Call your doctor if you think you are having a problem with your medicine. When should you call for help? Call 911 anytime you think you may need emergency care. For example, call if:  ? · You passed out (lost consciousness). ?Call your doctor now or seek immediate medical care if:  ? · You have new or worse pain. ? · You have new or worse bleeding from the rectum. ? · You are dizzy or light-headed, or you feel like you may faint. ? Watch closely for changes in your health, and be sure to contact your doctor if:  ? · You cannot pass stools or gas. ? · You do not get better as expected. Where can you learn more? Go to http://keely-kacie.info/. Enter V765 in the search box to learn more about \"Rectal Bleeding: Care Instructions. \"  Current as of: May 12, 2017  Content Version: 11.4  © 8487-8722 [employer]. Care instructions adapted under license by [employer] (which disclaims liability or warranty for this information). If you have questions about a medical condition or this instruction, always ask your healthcare professional. [place] [address] any warranty or liability for your use of this information. Hemorrhoids: Care Instructions  Your Care Instructions    Hemorrhoids are enlarged veins that develop in the anal canal. Bleeding during bowel movements, itching, swelling, and rectal pain are the most common symptoms. They can be uncomfortable at times, but hemorrhoids rarely are a serious problem. You can treat most hemorrhoids with simple changes to your diet and bowel habits.  These changes include eating more fiber and not straining to pass stools. Most hemorrhoids do not need surgery or other treatment unless they are very large and painful or bleed a lot. Follow-up care is a key part of your treatment and safety. Be sure to make and go to all appointments, and call your doctor if you are having problems. It's also a good idea to know your test results and keep a list of the medicines you take. How can you care for yourself at home? · Sit in a few inches of warm water (sitz bath) 3 times a day and after bowel movements. The warm water helps with pain and itching. · Put ice on your anal area several times a day for 10 minutes at a time. Put a thin cloth between the ice and your skin. Follow this by placing a warm, wet towel on the area for another 10 to 20 minutes. · Take pain medicines exactly as directed. ¨ If the doctor gave you a prescription medicine for pain, take it as prescribed. ¨ If you are not taking a prescription pain medicine, ask your doctor if you can take an over-the-counter medicine. · Keep the anal area clean, but be gentle. Use water and a fragrance-free soap, such as [place], or use baby wipes or medicated pads, such as Tucks. · Wear cotton underwear and loose clothing to decrease moisture in the anal area. · Eat more fiber. Include foods such as whole-grain breads and cereals, raw vegetables, raw and dried fruits, and beans. · Drink plenty of fluids, enough so that your urine is light yellow or clear like water. If you have kidney, heart, or liver disease and have to limit fluids, talk with your doctor before you increase the amount of fluids you drink. · Use a stool softener that contains bran or psyllium. You can save money by buying bran or psyllium (available in bulk at most health food stores) and sprinkling it on foods or stirring it into fruit juice. Or you can use a product such as Metamucil or Hydrocil. · Practice healthy bowel habits.   ¨ Go to the bathroom as soon as you have the urge. ¨ Avoid straining to pass stools. Relax and give yourself time to let things happen naturally. ¨ Do not hold your breath while passing stools. ¨ Do not read while sitting on the toilet. Get off the toilet as soon as you have finished. · Take your medicines exactly as prescribed. Call your doctor if you think you are having a problem with your medicine. When should you call for help? Call 911 anytime you think you may need emergency care. For example, call if:  ? · You pass maroon or very bloody stools. ?Call your doctor now or seek immediate medical care if:  ? · You have increased pain. ? · You have increased bleeding. ? Watch closely for changes in your health, and be sure to contact your doctor if:  ? · Your symptoms have not improved after 3 or 4 days. Where can you learn more? Go to http://keely-kacie.info/. Enter F228 in the search box to learn more about \"Hemorrhoids: Care Instructions. \"  Current as of: May 12, 2017  Content Version: 11.4  © 9294-1378 [employer]. Care instructions adapted under license by [employer] (which disclaims liability or warranty for this information). If you have questions about a medical condition or this instruction, always ask your healthcare professional. [place] [address] any warranty or liability for your use of this information. Hemorrhoids: Care Instructions  Your Care Instructions    Hemorrhoids are enlarged veins that develop in the anal canal. Bleeding during bowel movements, itching, swelling, and rectal pain are the most common symptoms. They can be uncomfortable at times, but hemorrhoids rarely are a serious problem. You can treat most hemorrhoids with simple changes to your diet and bowel habits. These changes include eating more fiber and not straining to pass stools.  Most hemorrhoids do not need surgery or other treatment unless they are very large and painful or bleed a lot. Follow-up care is a key part of your treatment and safety. Be sure to make and go to all appointments, and call your doctor if you are having problems. It's also a good idea to know your test results and keep a list of the medicines you take. How can you care for yourself at home? · Sit in a few inches of warm water (sitz bath) 3 times a day and after bowel movements. The warm water helps with pain and itching. · Put ice on your anal area several times a day for 10 minutes at a time. Put a thin cloth between the ice and your skin. Follow this by placing a warm, wet towel on the area for another 10 to 20 minutes. · Take pain medicines exactly as directed. ¨ If the doctor gave you a prescription medicine for pain, take it as prescribed. ¨ If you are not taking a prescription pain medicine, ask your doctor if you can take an over-the-counter medicine. · Keep the anal area clean, but be gentle. Use water and a fragrance-free soap, such as [place], or use baby wipes or medicated pads, such as Tucks. · Wear cotton underwear and loose clothing to decrease moisture in the anal area. · Eat more fiber. Include foods such as whole-grain breads and cereals, raw vegetables, raw and dried fruits, and beans. · Drink plenty of fluids, enough so that your urine is light yellow or clear like water. If you have kidney, heart, or liver disease and have to limit fluids, talk with your doctor before you increase the amount of fluids you drink. · Use a stool softener that contains bran or psyllium. You can save money by buying bran or psyllium (available in bulk at most health food stores) and sprinkling it on foods or stirring it into fruit juice. Or you can use a product such as Metamucil or Hydrocil. · Practice healthy bowel habits. ¨ Go to the bathroom as soon as you have the urge. ¨ Avoid straining to pass stools. Relax and give yourself time to let things happen naturally.   ¨ Do not hold your breath while passing stools. ¨ Do not read while sitting on the toilet. Get off the toilet as soon as you have finished. · Take your medicines exactly as prescribed. Call your doctor if you think you are having a problem with your medicine. When should you call for help? Call 911 anytime you think you may need emergency care. For example, call if:  ? · You pass maroon or very bloody stools. ?Call your doctor now or seek immediate medical care if:  ? · You have increased pain. ? · You have increased bleeding. ? Watch closely for changes in your health, and be sure to contact your doctor if:  ? · Your symptoms have not improved after 3 or 4 days. Where can you learn more? Go to http://keely-kacie.info/. Enter F228 in the search box to learn more about \"Hemorrhoids: Care Instructions. \"  Current as of: May 12, 2017  Content Version: 11.4  © 5840-0122 [employer]. Care instructions adapted under license by [employer] (which disclaims liability or warranty for this information). If you have questions about a medical condition or this instruction, always ask your healthcare professional. [place] [address] any warranty or liability for your use of this information.

## 2018-04-18 NOTE — PROGRESS NOTES
HISTORY OF PRESENT ILLNESS  Ambar Gill is a 41 y.o. female presents for acute care  HPI  Rectal pain and bleed every time she has a BM since Monday. Remote history of similar symptoms; no prior evaluation     No constipation, straining or injury. Denies dietary change of new medication    S/p PAH, secondary to uterine fibroid, October 2017. Had 6 months follow up, new gyn provider Dr. Christian Jj. + fatigue     Recurrent migraine headache, current medication no longer effective. Past Medical History:   Diagnosis Date    Anemia NEC     on fe pill    Fibroid, uterine     Migraine     Postpartum depression     years ago       Current Outpatient Prescriptions on File Prior to Visit   Medication Sig Dispense Refill    topiramate (TOPAMAX) 50 mg tablet TAKE 1 TABLET BY MOUTH TWICE A  Tab 0     No current facility-administered medications on file prior to visit. Review of Systems   Constitutional: Positive for malaise/fatigue. Negative for chills and fever. Eyes: Positive for blurred vision. Respiratory: Negative. Cardiovascular: Negative for chest pain and palpitations. Gastrointestinal: Positive for blood in stool. Negative for abdominal pain, constipation, diarrhea, heartburn, nausea and vomiting. Genitourinary: Negative. Neurological: Positive for headaches. Negative for dizziness. Physical Exam   Constitutional: She is oriented to person, place, and time. She appears well-developed and well-nourished. No distress. Eyes: Conjunctivae are normal. Right eye exhibits no discharge. Left eye exhibits no discharge. No scleral icterus. Cardiovascular: Normal rate, regular rhythm and normal heart sounds. Pulmonary/Chest: Effort normal and breath sounds normal.   Abdominal: Soft. Bowel sounds are normal. She exhibits no distension and no mass. There is no tenderness. There is no rebound and no guarding. Genitourinary: Rectal exam shows guaiac positive stool.  Rectal exam shows no external hemorrhoid, no internal hemorrhoid, no fissure, no mass, no tenderness and anal tone normal.   Musculoskeletal: She exhibits no edema, tenderness or deformity. Neurological: She is alert and oriented to person, place, and time. No cranial nerve deficit. Skin: Skin is warm and dry. She is not diaphoretic. ASSESSMENT and PLAN    ICD-10-CM ICD-9-CM    1. Rectal bleed K62.5 569.3 REFERRAL TO COLON AND RECTAL SURGERY      hydrocortisone (ANUSOL-HC) 2.5 % rectal cream      CANCELED: AMB POC URINALYSIS DIP STICK AUTO W/O MICRO   2. IFG (impaired fasting glucose) R73.01 790.21 HEMOGLOBIN A1C WITH EAG      METABOLIC PANEL, COMPREHENSIVE      CANCELED: AMB POC URINALYSIS DIP STICK AUTO W/O MICRO   3. Anemia, unspecified type D64.9 285.9 CBC WITH AUTOMATED DIFF      IRON PROFILE      FERRITIN   4. Chronic nonintractable headache, unspecified headache type R51 784.0 REFERRAL TO NEUROLOGY   5. Migraine without aura and with status migrainosus, not intractable G43.001 346.12 SUMAtriptan (IMITREX) 100 mg tablet   6.  Fatigue, unspecified type R53.83 780.79      Follow-up Disposition:  Return if symptoms worsen or fail to improve.  lab results and schedule of future lab studies reviewed with patient  reviewed medications and side effects in detail    Discussed likely internal hemorrhoid vs polyp, indications for referral. Trial of above medication,

## 2018-04-18 NOTE — PROGRESS NOTES
RM#9  Chief Complaint   Patient presents with    Rectal Bleeding     blood after bowel movements     1. Have you been to the ER, urgent care clinic since your last visit? Hospitalized since your last visit? No    2. Have you seen or consulted any other health care providers outside of the [facility] since your last visit? Include any pap smears or colon screening.  No  Health Maintenance Due   Topic Date Due    Influenza Age 5 to Adult  08/01/2017    PAP AKA CERVICAL CYTOLOGY  02/18/2018

## 2018-04-19 LAB
ALBUMIN SERPL-MCNC: 4.3 G/DL (ref 3.5–5.5)
ALBUMIN/GLOB SERPL: 1.5 {RATIO} (ref 1.2–2.2)
ALP SERPL-CCNC: 50 IU/L (ref 39–117)
ALT SERPL-CCNC: 6 IU/L (ref 0–32)
AST SERPL-CCNC: 14 IU/L (ref 0–40)
BASOPHILS # BLD AUTO: 0 X10E3/UL (ref 0–0.2)
BASOPHILS NFR BLD AUTO: 1 %
BILIRUB SERPL-MCNC: 0.4 MG/DL (ref 0–1.2)
BUN SERPL-MCNC: 7 MG/DL (ref 6–20)
BUN/CREAT SERPL: 10 (ref 9–23)
CALCIUM SERPL-MCNC: 8.9 MG/DL (ref 8.7–10.2)
CHLORIDE SERPL-SCNC: 107 MMOL/L (ref 96–106)
CO2 SERPL-SCNC: 22 MMOL/L (ref 18–29)
CREAT SERPL-MCNC: 0.69 MG/DL (ref 0.57–1)
EOSINOPHIL # BLD AUTO: 0.1 X10E3/UL (ref 0–0.4)
EOSINOPHIL NFR BLD AUTO: 2 %
ERYTHROCYTE [DISTWIDTH] IN BLOOD BY AUTOMATED COUNT: 14.6 % (ref 12.3–15.4)
EST. AVERAGE GLUCOSE BLD GHB EST-MCNC: 111 MG/DL
FERRITIN SERPL-MCNC: 57 NG/ML (ref 15–150)
GLOBULIN SER CALC-MCNC: 2.9 G/DL (ref 1.5–4.5)
GLUCOSE SERPL-MCNC: 93 MG/DL (ref 65–99)
HBA1C MFR BLD: 5.5 % (ref 4.8–5.6)
HCT VFR BLD AUTO: 38.5 % (ref 34–46.6)
HGB BLD-MCNC: 12.3 G/DL (ref 11.1–15.9)
IMM GRANULOCYTES # BLD: 0 X10E3/UL (ref 0–0.1)
IMM GRANULOCYTES NFR BLD: 0 %
IRON SATN MFR SERPL: 18 % (ref 15–55)
IRON SERPL-MCNC: 68 UG/DL (ref 27–159)
LYMPHOCYTES # BLD AUTO: 2.1 X10E3/UL (ref 0.7–3.1)
LYMPHOCYTES NFR BLD AUTO: 38 %
MCH RBC QN AUTO: 27.6 PG (ref 26.6–33)
MCHC RBC AUTO-ENTMCNC: 31.9 G/DL (ref 31.5–35.7)
MCV RBC AUTO: 86 FL (ref 79–97)
MONOCYTES # BLD AUTO: 0.4 X10E3/UL (ref 0.1–0.9)
MONOCYTES NFR BLD AUTO: 8 %
NEUTROPHILS # BLD AUTO: 2.8 X10E3/UL (ref 1.4–7)
NEUTROPHILS NFR BLD AUTO: 51 %
PLATELET # BLD AUTO: 270 X10E3/UL (ref 150–379)
POTASSIUM SERPL-SCNC: 4.2 MMOL/L (ref 3.5–5.2)
PROT SERPL-MCNC: 7.2 G/DL (ref 6–8.5)
RBC # BLD AUTO: 4.46 X10E6/UL (ref 3.77–5.28)
SODIUM SERPL-SCNC: 142 MMOL/L (ref 134–144)
TIBC SERPL-MCNC: 382 UG/DL (ref 250–450)
UIBC SERPL-MCNC: 314 UG/DL (ref 131–425)
WBC # BLD AUTO: 5.5 X10E3/UL (ref 3.4–10.8)

## 2018-05-01 ENCOUNTER — TELEPHONE (OUTPATIENT)
Dept: INTERNAL MEDICINE CLINIC | Age: 38
End: 2018-05-01

## 2018-05-01 NOTE — TELEPHONE ENCOUNTER
Spoke to patient. Writer informed the patient that patient's urine was normal . Patient wants Jennifer De La Cruz to know her urine is cloudy after drinking water and cranberry juice. Will discuss with Jennifer Hanna. Pierce De La Cruz, NP. Patient given an opportunity to ask questions, repeated information, and verbalized understanding.

## 2018-05-01 NOTE — TELEPHONE ENCOUNTER
Pt states that she left a urine sample on 4/20 with Dr. Alicia Tejada nurse because her child had an appt. She states that nurse was going to let [facility] know. Do we have those results?  Please call pt and advise

## 2018-07-17 DIAGNOSIS — G43.909 MIGRAINE WITHOUT STATUS MIGRAINOSUS, NOT INTRACTABLE, UNSPECIFIED MIGRAINE TYPE: ICD-10-CM

## 2018-07-17 RX ORDER — TOPIRAMATE 50 MG/1
TABLET, FILM COATED ORAL
Qty: 180 TAB | Refills: 0 | Status: SHIPPED | OUTPATIENT
Start: 2018-07-17

## 2019-01-10 ENCOUNTER — HOSPITAL ENCOUNTER (EMERGENCY)
Age: 39
Discharge: HOME | End: 2019-01-10
Attending: EMERGENCY MEDICINE
Payer: COMMERCIAL

## 2019-01-10 ENCOUNTER — APPOINTMENT (OUTPATIENT)
Dept: GENERAL RADIOLOGY | Age: 39
End: 2019-01-10
Attending: PHYSICIAN ASSISTANT
Payer: COMMERCIAL

## 2019-01-10 VITALS
HEIGHT: 66 IN | TEMPERATURE: 99 F | HEART RATE: 72 BPM | SYSTOLIC BLOOD PRESSURE: 132 MMHG | BODY MASS INDEX: 34.39 KG/M2 | OXYGEN SATURATION: 100 % | RESPIRATION RATE: 16 BRPM | DIASTOLIC BLOOD PRESSURE: 91 MMHG | WEIGHT: 214 LBS

## 2019-01-10 DIAGNOSIS — M79.644 PAIN OF RIGHT THUMB: Primary | ICD-10-CM

## 2019-01-10 PROCEDURE — 73140 X-RAY EXAM OF FINGER(S): CPT

## 2019-01-10 PROCEDURE — 99283 EMERGENCY DEPT VISIT LOW MDM: CPT

## 2019-01-10 PROCEDURE — 75810000053 HC SPLINT APPLICATION

## 2019-01-10 RX ORDER — IBUPROFEN 600 MG/1
600 TABLET ORAL
Qty: 15 TAB | Refills: 0 | Status: SHIPPED | OUTPATIENT
Start: 2019-01-10

## 2019-01-10 NOTE — ED TRIAGE NOTES
Patient arrives c/o right thumb pain x 2 weeks. Patient states it has progressively gotten more difficult to write and do things with right hand. Denies injury.

## 2019-01-10 NOTE — DISCHARGE INSTRUCTIONS
Patient Education        Hand Pain: Care Instructions  Your Care Instructions    Common causes of hand pain are overuse and injuries, such as might happen during sports or home repair projects. Everyday wear and tear, especially as you get older, also can cause hand pain. Most minor hand injuries will heal on their own, and home treatment is usually all you need to do. If you have sudden and severe pain, you may need tests and treatment. Follow-up care is a key part of your treatment and safety. Be sure to make and go to all appointments, and call your doctor if you are having problems. It's also a good idea to know your test results and keep a list of the medicines you take. How can you care for yourself at home? · Take pain medicines exactly as directed. ? If the doctor gave you a prescription medicine for pain, take it as prescribed. ? If you are not taking a prescription pain medicine, ask your doctor if you can take an over-the-counter medicine. · Rest and protect your hand. Take a break from any activity that may cause pain. · Put ice or a cold pack on your hand for 10 to 20 minutes at a time. Put a thin cloth between the ice and your skin. · Prop up the sore hand on a pillow when you ice it or anytime you sit or lie down during the next 3 days. Try to keep it above the level of your heart. This will help reduce swelling. · If your doctor recommends a sling, splint, or elastic bandage to support your hand, wear it as directed. When should you call for help? Call 911 anytime you think you may need emergency care. For example, call if:    · Your hand turns cool or pale or changes color.    Call your doctor now or seek immediate medical care if:    · You cannot move your hand.     · Your hand pops, moves out of its normal position, and then returns to its normal position.     · You have signs of infection, such as:  ? Increased pain, swelling, warmth, or redness.   ? Red streaks leading from the sore area.  ? Pus draining from a place on your hand. ? A fever.     · Your hand feels numb or tingly.    Watch closely for changes in your health, and be sure to contact your doctor if:    · Your hand feels unstable when you try to use it.     · You do not get better as expected.     · You have any new symptoms, such as swelling.     · Bruises from an injury to your hand last longer than 2 weeks. Where can you learn more? Go to http://keely-kacie.info/. Enter R273 in the search box to learn more about \"Hand Pain: Care Instructions. \"  Current as of: November 20, 2017  Content Version: 11.8  © 9844-3263 [employer]. Care instructions adapted under license by [employer] (which disclaims liability or warranty for this information). If you have questions about a medical condition or this instruction, always ask your healthcare professional. [place] [address] any warranty or liability for your use of this information. Patient Education   Patient Education        Learning About RICE (Rest, Ice, Compression, and Elevation)  What is RICE? RICE is a way to care for an injury. RICE helps relieve pain and swelling. It may also help with healing and flexibility. RICE stands for:  · Rest and protect the injured or sore area. · Ice or a cold pack used as soon as possible. · Compression, or wrapping the injured or sore area with an elastic bandage. · Elevation (propping up) the injured or sore area. How do you do RICE? You can use RICE for home treatment when you have general aches and pains or after an injury or surgery. Rest  · Do not put weight on the injury for at least 24 to 48 hours. · Use crutches for a badly sprained knee or ankle. · Support a sprained wrist, elbow, or shoulder with a sling. Ice  · Put ice or a cold pack on the injury right away to reduce pain and swelling. Frozen vegetables will also work as an ice pack.  Put a thin cloth between the ice or cold pack and your skin. The cloth protects the injured area from getting too cold. · Use ice for 10 to 15 minutes at a time for the first 48 to 72 hours. Compression  · Use compression for sprains, strains, and surgeries of the arms and legs. · Wrap the injured area with an elastic bandage or compression sleeve to reduce swelling. · Don't wrap it too tightly. If the area below it feels numb, tingles, or feels cool, loosen the wrap. Elevation  · Use elevation for areas of the body that can be propped up, such as arms and legs. · Prop up the injured area on pillows whenever you use ice. Keep it propped up anytime you sit or lie down. · Try to keep the injured area at or above the level of your heart. This will help reduce swelling and bruising. Where can you learn more? Go to http://keely-kacie.info/. Enter X991 in the search box to learn more about \"Learning About RICE (Rest, Ice, Compression, and Elevation). \"  Current as of: November 29, 2017  Content Version: 11.8  © 8411-8909 [employer]. Care instructions adapted under license by [employer] (which disclaims liability or warranty for this information). If you have questions about a medical condition or this instruction, always ask your healthcare professional. [address] any warranty or liability for your use of this information. Tendon Injury (Tendinopathy): Care Instructions  Your Care Instructions    Tendons are tough, flexible tissues that connect muscle to bone. A tendon can hurt or get torn from overuse or aging, especially tendons in the shoulder, elbow, wrist, hip, knee, or ankle. Tendon injuries may be called tendinopathy or tendinitis. Tendon injuries can occur from any motion you have to repeat in a job, sports, or daily activities. Tennis elbow is one common tendon injury.   You can treat most tendon problems with over-the-counter pain medicine, rest, changes in your activities, and physical therapy. Follow-up care is a key part of your treatment and safety. Be sure to make and go to all appointments, and call your doctor if you are having problems. It's also a good idea to know your test results and keep a list of the medicines you take. How can you care for yourself at home? · Rest the sore area. You may have to stop doing the activity that caused the tendon pain for a while. · Take an over-the-counter pain medicine, such as acetaminophen (Tylenol), ibuprofen (Advil, Motrin), or naproxen (Aleve). Read and follow all instructions on the label. · Do not take two or more pain medicines at the same time unless the doctor told you to. Many pain medicines have acetaminophen, which is Tylenol. Too much acetaminophen (Tylenol) can be harmful. · Put ice or a cold pack on the sore area for 10 to 20 minutes at a time. Try to do this every 1 to 2 hours for the next 3 days (when you are awake) or until any swelling goes down. Put a thin cloth between the ice and your skin. · Prop up the sore area on a pillow when you ice it or anytime you sit or lie down during the next 3 days. Try to keep it above the level of your heart. This will help reduce swelling. · Follow your doctor's advice for wearing and caring for a sling, splint, or cast. In some cases, you may wear one of these for a while to help your tendon heal.  · Follow your doctor's advice for stretching and physical therapy. Gently move your joint through its full range of motion. This will prevent stiffness in your joint. · Go back to your activity slowly. Warm up before and stretch after the activity. You also can try making some changes. For example, if a sport caused your tendon pain, alternate the sport with another activity. If using a tool causes pain, switch hands or change your . Stop the activity if it hurts. After the activity, apply ice to prevent pain and swelling. · Do not smoke. Smoking can slow healing. If you need help quitting, talk to your doctor about stop-smoking programs and medicines. These can increase your chances of quitting for good. When should you call for help? Watch closely for changes in your health, and be sure to contact your doctor if:    · Your pain gets worse.     · You do not get better as expected. Where can you learn more? Go to http://keely-kacie.info/. Enter A157 in the search box to learn more about \"Tendon Injury (Tendinopathy): Care Instructions. \"  Current as of: November 29, 2017  Content Version: 11.8  © 5950-4952 Healthwise, [employer]. Care instructions adapted under license by [employer] (which disclaims liability or warranty for this information). If you have questions about a medical condition or this instruction, always ask your healthcare professional. [address] any warranty or liability for your use of this information.

## 2019-01-10 NOTE — ED NOTES
Jeronimo Hong gave and reviewed discharge instructions with patient. Patient verbalizes understanding of discharge instructions. Pt alert and oriented, appears in no acute distress, respirations equal and unlabored. Ambulatory upon discharge with steady gait.

## 2019-01-10 NOTE — ED PROVIDER NOTES
46year old female presenting for RIGHT thumb pain. \"my thumb is kind of swollen and it is becoming a problem for me because I have to do writing. \"  Right-handed. Pt note that she first noticed pain and swelling about 2 weeks ago, worse since then. No wounds or other injuries. No fever. Pain is localized to the DIP joint, reports feeling of tightness over the proximal phalanx. No relief with activity or cold compress. Sometimes feels numb or tingling. Dopes not notice more at night. PMHx: migraines Social: occasional tobacco and alcohol use. Past Medical History:  
Diagnosis Date  Anemia NEC   
 on fe pill  Fibroid, uterine  Migraine  Postpartum depression   
 years ago Past Surgical History:  
Procedure Laterality Date  HX DILATION AND CURETTAGE  ,,,2003 x4  
 HX GYN    
 BTL 2013  HX GYN  [id number]  
 uterine ablation Family History:  
Problem Relation Age of Onset  Diabetes Mother  Hypertension Mother  High Cholesterol Mother  Stroke Mother  Cancer Maternal Grandmother  Migraines Sister  Migraines Brother Social History Socioeconomic History  Marital status:  Spouse name: Alexandra Hernández  Number of children: 1  Years of education: 15  
 Highest education level: Not on file Social Needs  Financial resource strain: Not on file  Food insecurity - worry: Not on file  Food insecurity - inability: Not on file  Transportation needs - medical: Not on file  Transportation needs - non-medical: Not on file Occupational History Employer: NOT EMPLOYED Tobacco Use  Smoking status: Current Some Day Smoker Packs/day: 0.50 Years: 10.00 Pack years: 5.00 Last attempt to quit: 2013 Years since quittin.7  Smokeless tobacco: Never Used Substance and Sexual Activity  Alcohol use: Yes Comment: 2 DRINKS A MONTH  
 Drug use:  Yes  
 Frequency: 1.0 times per week Types: Cocaine Comment: last cocaine use 2005 per 10/1/15 PAT call  Sexual activity: Yes  
  Partners: Male Birth control/protection: Surgical  
  Comment: tubes tied Other Topics Concern   Service No  
 Blood Transfusions No  
 Caffeine Concern No  
 Occupational Exposure No  
 Hobby Hazards No  
 Sleep Concern No  
 Stress Concern Yes  Weight Concern No  
 Special Diet No  
 Back Care No  
 Exercise No  
 Bike Helmet Not Asked [address] [facility] Yes  Self-Exams Yes Social History Narrative  Not on file ALLERGIES: Penicillin g Review of Systems Constitutional: Negative for fever. Gastrointestinal: Negative for vomiting. Musculoskeletal: Positive for arthralgias. Skin: Negative for color change and wound. Neurological: Negative for weakness. All other systems reviewed and are negative. Vitals:  
 01/10/19 1102 01/10/19 1136 BP:  (!) 132/91 Pulse: 83 72 Resp:  16 Temp:  99 °F (37.2 °C) SpO2: 99% 100% Weight:  97.1 kg (214 lb) Height:  5' 6\" (1.676 m) Physical Exam  
Constitutional: She is oriented to person, place, and time. She appears well-developed and well-nourished. Pleasant AA female HENT:  
Head: Normocephalic. Eyes: Conjunctivae are normal.  
Neck: Neck supple. Cardiovascular: Normal rate. Pulmonary/Chest: Effort normal. No respiratory distress. Abdominal: She exhibits no distension. Musculoskeletal: Normal range of motion. RIGHT HAND: slight, barely perceptible swelling of the thumb noted. No erythema or warmth. Flexion intact but somewhat limited 2/2 pain. Mild TTP over the DIP joint. No TTP over the thenar eminence or hand. NVID Negative Phalen's test  
Neurological: She is alert and oriented to person, place, and time. Skin: Skin is warm and dry. Psychiatric: She has a normal mood and affect. Nursing note and vitals reviewed.  
  
 
MERI 
 Number of Diagnoses or Management Options Pain of right thumb:  
Diagnosis management comments: 46year old right-handed female presenting fro 2 weeks of r thumb pain, swelling, worsened with activity. No fever, erythema, warmth, etc. c/f gouty or septiciar thritis, no other joint involvement c/f RA, other rheumatologic disorder. Not clearly in median nerve distribution c/w CTS, ? Tendonitis. Will splint, give regimen of NSAID, ortho hand f/u. Amount and/or Complexity of Data Reviewed Tests in the radiology section of CPT®: ordered and reviewed Discuss the patient with other providers: yes (Dr. Flavio Mccray, ED attending) Procedures I was personally available for consultation in the emergency department. I have reviewed the chart and agree with the documentation recorded by the [facility], including the assessment, treatment plan, and disposition.  
Austin Peters MD

## 2019-01-15 ENCOUNTER — OFFICE VISIT (OUTPATIENT)
Dept: INTERNAL MEDICINE CLINIC | Age: 39
End: 2019-01-15

## 2019-01-15 VITALS
WEIGHT: 224.2 LBS | BODY MASS INDEX: 36.03 KG/M2 | HEART RATE: 77 BPM | DIASTOLIC BLOOD PRESSURE: 68 MMHG | SYSTOLIC BLOOD PRESSURE: 119 MMHG | TEMPERATURE: 98.3 F | HEIGHT: 66 IN | OXYGEN SATURATION: 98 % | RESPIRATION RATE: 12 BRPM

## 2019-01-15 DIAGNOSIS — R63.5 WEIGHT GAIN: ICD-10-CM

## 2019-01-15 DIAGNOSIS — R73.09 ELEVATED GLUCOSE: ICD-10-CM

## 2019-01-15 DIAGNOSIS — N64.4 BREAST PAIN: Primary | ICD-10-CM

## 2019-01-15 DIAGNOSIS — E66.01 SEVERE OBESITY (HCC): ICD-10-CM

## 2019-01-15 NOTE — PATIENT INSTRUCTIONS
Please call scheduling department to arrange for testing at: [phone] Learning About Dietary Guidelines What are the Dietary Guidelines for Americans? Dietary Guidelines for Americans provide tips for eating well and staying healthy. This helps reduce the risk for long-term (chronic) diseases. These adult guidelines from the [place] recommend that you: 
· Eat lots of fruits, vegetables, whole grains, and low-fat or nonfat dairy products. · Try to balance your eating with your activity. This helps you stay at a healthy weight. · Drink alcohol in moderation, if at all. · Limit foods high in salt, saturated fat, trans fat, and added sugar. What is MyPlate? MyPlate is the U.S. government's food guide. It can help you make your own well-balanced eating plan. A balanced eating plan means that you eat enough, but not too much, and that your food gives you the nutrients you need to stay healthy. MyPlate focuses on eating plenty of whole grains, fruits, and vegetables, and on limiting fat and sugar. It is available online at www. ChooseMyPlate.gov. How can you get started? MyPlate suggests that most adults eat certain amounts from the different food groups: 
Grains Eat 5 to 8 ounces of grains each day. Half of those should be whole grains. Choose whole-grain breads, cold and cooked cereals and grains, pasta (without creamy sauces), hard rolls, or low-fat or fat-free crackers. Vegetables Eat 2 to 3 cups of vegetables every day. They contain little if any fat. And they have lots of nutrients that help protect against heart disease. Fruits Eat 1½ to 2 cups of fruits every day. Fruits contain very little fat but lots of nutrients. Protein foods Most adults need 5 to 6½ ounces each day. Choose fish and lean poultry more often. Eat red meat and fried meats less often. Dried beans, tofu, and nuts are also good sources of protein. Dairy Most adults need 3 cups of milk and milk products a day. Choose low-fat or fat-free products from this food group. If you have problems digesting milk, try eating cheese or yogurt instead. Limit fats and oils, including those used in cooking. When you do use fats, choose oils that are liquid at room temperature (unsaturated fats). These include canola oil and olive oil. Avoid foods with trans fats, such as many fried foods, cookies, and snack foods. Where can you learn more? Go to http://keely-kacie.info/. Enter H297 in the search box to learn more about \"Learning About Dietary Guidelines. \" Current as of: March 29, 2018 Content Version: 11.8 © 8931-9835 Healthwise, [employer]. Care instructions adapted under license by [employer] (which disclaims liability or warranty for this information). If you have questions about a medical condition or this instruction, always ask your healthcare professional. [place] [address] any warranty or liability for your use of this information.

## 2019-01-15 NOTE — PROGRESS NOTES
Room 1 Chief Complaint Patient presents with  Breast Problem  
  x 1 week  feeling tightness and soreness, pain when laying on her side, pt had hysterectomy 10/17  Blood sugar problem  
  patient was prediabetic and had further appointments for studies, but was unable to schedule 1. Have you been to the ER, urgent care clinic since your last visit? Hospitalized since your last visit? Yes When: 1/10/19 [facility]: [facility] Reason for visit: torn tendon in right hand/wrist 
 
2. Have you seen or consulted any other health care providers outside of the [address] [place] [address] since your last visit? Include any pap smears or colon screening. Yes When: 4/2018 Where: [facility] Reason for visit: Yearly exam, hysterectomy f/u Health Maintenance Due Topic Date Due  
 PAP AKA CERVICAL CYTOLOGY  02/18/2018  Influenza Age 5 to Adult  08/01/2018 Abuse Screening Questionnaire 1/15/2019 Do you ever feel afraid of your partner? Areta Lusty Are you in a relationship with someone who physically or mentally threatens you? Areta Lusty Is it safe for you to go home? Y  
 
 
PHQ over the last two weeks 1/15/2019 Little interest or pleasure in doing things Not at all Feeling down, depressed, irritable, or hopeless Not at all Total Score PHQ 2 0 Learning Assessment 1/15/2019 PRIMARY LEARNER Patient HIGHEST LEVEL OF EDUCATION - PRIMARY LEARNER  SOME COLLEGE  
BARRIERS PRIMARY LEARNER NONE  
CO-LEARNER CAREGIVER No  
PRIMARY LANGUAGE ENGLISH  
LEARNER PREFERENCE PRIMARY DEMONSTRATION  
ANSWERED BY Venus Avila RELATIONSHIP SELF

## 2019-01-15 NOTE — PROGRESS NOTES
ACUTE VISIT  
 
HPI:  
Sonia Bell is a 46 y.o. female, she presents today for:  
 
46year old woman with history of migraines, glucose elevation, obesity Last seen in July 2018 for rectal bleeding. Breast pain x1 week. - feels heavy and tender on both breasts, tender at nipples. - no new drainage.  
 - family history of breast cancer in great grandmother, but not clear details. - no other tenderness. Hysterectomy completed just over 1 year ago for fibroids. Has a new gynecologist.  
 
(Last A1C 5.5) Patient reports she works at night and finds that her eating habits are not good as a result. (CNA in hospice care). Notes that thumb pain got better. (Seen by ER physician only). ROS: no new fever, no new rash, no increased thirst. Notes hunger while working at night. Medications used for acute illness: none. Current Outpatient Medications on File Prior to Visit Medication Sig  ibuprofen (MOTRIN) 600 mg tablet Take 1 Tab by mouth every six (6) hours as needed for Pain.  topiramate (TOPAMAX) 50 mg tablet TAKE 1 TABLET BY MOUTH TWICE A DAY  SUMAtriptan (IMITREX) 100 mg tablet Take 1 Tab by mouth once as needed for Migraine for up to 1 dose. May repeat after 2 hours if not better, maximum 2 tablets in 24 hours  Indications: Migraine  hydrocortisone (ANUSOL-HC) 2.5 % rectal cream Insert  into rectum four (4) times daily. No current facility-administered medications on file prior to visit. Allergies Allergen Reactions  Penicillin G Rash PMH/PSH/FH: reviewed and updated Sochx: 
 reports that she has been smoking. She has a 5.00 pack-year smoking history. she has never used smokeless tobacco. She reports that she drinks alcohol. She reports that she uses drugs. Drug: Cocaine. Frequency: 1.00 time per week.   
 
PE: 
Blood pressure 119/68, pulse 77, temperature 98.3 °F (36.8 °C), temperature source Oral, resp. rate 12, height 5' 6\" (1.676 m), weight 224 lb 3.2 oz (101.7 kg), last menstrual period 09/17/2017, SpO2 98 %. Body mass index is 36.19 kg/m². Physical Exam  
Constitutional: She is oriented to person, place, and time. She appears well-developed. No distress. HENT:  
Head: Normocephalic. Mouth/Throat: Oropharynx is clear and moist.  
Eyes: Conjunctivae are normal. Pupils are equal, round, and reactive to light. Neck: Neck supple. Cardiovascular: Normal rate. Pulmonary/Chest: Effort normal.  
Neurological: She is alert and oriented to person, place, and time. Skin: No rash noted. Nursing note and vitals reviewed. Breasts-symmetric without dimpling/retraction/palpabable masses. No axillary adenopathy or nipple discharge. No tenderness noted. Instructed in BSE. Large slightly pendulous, tenderness under nipple Labs: 
No results found for any visits on 01/15/19. A/P Fredrik Sero Toniann Mcburney was seen today for had concerns including Breast Problem (x 1 week  feeling tightness and soreness, pain when laying on her side, pt had hysterectomy 10/17) and Blood sugar problem (patient was prediabetic and had further appointments for studies, but was unable to schedule). .  The diagnosis and plan was discussed including: ICD-10-CM ICD-9-CM 1. Breast pain N64.4 611.71 PROLACTIN  
2. Weight gain R63.5 783.1 TSH 3RD GENERATION 3. Elevated glucose R73.09 790.29 HEMOGLOBIN A1C WITH EAG  
   TSH 3RD GENERATION 4. Severe obesity ([facility] [address] T81.67 691.94 METABOLIC PANEL, COMPREHENSIVE Breast pain: possibly hormonal, has ovaries though s/p hysterectomy from fibroids. Check thyroid/prolactin, though no discharge. Will also get bilateral diagnostic mammogram to further rule out mass. Weight gain and obesity: screen thyroid hormones. Discussed cutting out juices/sugary beverages. Focus on increasing hydration during night shift. Follow-up further with Ms. Rachael Samaniego. Patient interested in considering metformin. 
 
- I advised her to call back or return to office if symptoms worsen/change/persist. 
- She was given AVS and expressed understanding with the diagnosis and plan as discussed. Follow-up Disposition: Not on File

## 2019-01-16 LAB
ALBUMIN SERPL-MCNC: 4 G/DL (ref 3.5–5.5)
ALBUMIN/GLOB SERPL: 1.4 {RATIO} (ref 1.2–2.2)
ALP SERPL-CCNC: 53 IU/L (ref 39–117)
ALT SERPL-CCNC: 13 IU/L (ref 0–32)
AST SERPL-CCNC: 16 IU/L (ref 0–40)
BILIRUB SERPL-MCNC: <0.2 MG/DL (ref 0–1.2)
BUN SERPL-MCNC: 7 MG/DL (ref 6–20)
BUN/CREAT SERPL: 12 (ref 9–23)
CALCIUM SERPL-MCNC: 9.2 MG/DL (ref 8.7–10.2)
CHLORIDE SERPL-SCNC: 105 MMOL/L (ref 96–106)
CO2 SERPL-SCNC: 24 MMOL/L (ref 20–29)
CREAT SERPL-MCNC: 0.59 MG/DL (ref 0.57–1)
EST. AVERAGE GLUCOSE BLD GHB EST-MCNC: 128 MG/DL
GLOBULIN SER CALC-MCNC: 2.8 G/DL (ref 1.5–4.5)
GLUCOSE SERPL-MCNC: 104 MG/DL (ref 65–99)
HBA1C MFR BLD: 6.1 % (ref 4.8–5.6)
POTASSIUM SERPL-SCNC: 4.4 MMOL/L (ref 3.5–5.2)
PROLACTIN SERPL-MCNC: 15.9 NG/ML (ref 4.8–23.3)
PROT SERPL-MCNC: 6.8 G/DL (ref 6–8.5)
SODIUM SERPL-SCNC: 144 MMOL/L (ref 134–144)
TSH SERPL DL<=0.005 MIU/L-ACNC: 1.97 UIU/ML (ref 0.45–4.5)

## 2019-01-18 NOTE — PROGRESS NOTES
Labs do not show normal thyroid hormone level, normal prolactin level. A1C is up from prior 9 months ago. But again, not in diabetic range. It would be reasonable to consider starting metformin to help protect health. Please be sure to discuss this with your primary provider. Normal/stable kidney function and liver numbers. Please be sure to complete mammogram as discussed. Lab letter generated Mulu Zuniga MD

## 2019-01-23 DIAGNOSIS — G43.001 MIGRAINE WITHOUT AURA AND WITH STATUS MIGRAINOSUS, NOT INTRACTABLE: ICD-10-CM

## 2019-01-24 RX ORDER — SUMATRIPTAN 100 MG/1
TABLET, FILM COATED ORAL
Qty: 15 TAB | Refills: 0 | Status: SHIPPED | OUTPATIENT
Start: 2019-01-24 | End: 2019-04-22 | Stop reason: SDUPTHER

## 2019-03-29 ENCOUNTER — OFFICE VISIT (OUTPATIENT)
Dept: INTERNAL MEDICINE CLINIC | Age: 39
End: 2019-03-29

## 2019-03-29 VITALS
TEMPERATURE: 98.3 F | OXYGEN SATURATION: 94 % | DIASTOLIC BLOOD PRESSURE: 76 MMHG | BODY MASS INDEX: 36.5 KG/M2 | RESPIRATION RATE: 16 BRPM | HEIGHT: 66 IN | SYSTOLIC BLOOD PRESSURE: 123 MMHG | WEIGHT: 227.13 LBS | HEART RATE: 68 BPM

## 2019-03-29 DIAGNOSIS — M54.6 ACUTE BILATERAL THORACIC BACK PAIN: Primary | ICD-10-CM

## 2019-03-29 DIAGNOSIS — M54.2 MUSCULOSKELETAL NECK PAIN: ICD-10-CM

## 2019-03-29 RX ORDER — METHOCARBAMOL 500 MG/1
500 TABLET, FILM COATED ORAL
Qty: 40 TAB | Refills: 1 | Status: SHIPPED | OUTPATIENT
Start: 2019-03-29 | End: 2019-04-22

## 2019-03-29 RX ORDER — IBUPROFEN 200 MG
TABLET ORAL
COMMUNITY

## 2019-03-29 NOTE — PATIENT INSTRUCTIONS
Back Pain: Care Instructions Your Care Instructions Back pain has many possible causes. It is often related to problems with muscles and ligaments of the back. It may also be related to problems with the nerves, discs, or bones of the back. Moving, lifting, standing, sitting, or sleeping in an awkward way can strain the back. Sometimes you don't notice the injury until later. Arthritis is another common cause of back pain. Although it may hurt a lot, back pain usually improves on its own within several weeks. Most people recover in 12 weeks or less. Using good home treatment and being careful not to stress your back can help you feel better sooner. Follow-up care is a key part of your treatment and safety. Be sure to make and go to all appointments, and call your doctor if you are having problems. It's also a good idea to know your test results and keep a list of the medicines you take. How can you care for yourself at home? · Sit or lie in positions that are most comfortable and reduce your pain. Try one of these positions when you lie down: ? Lie on your back with your knees bent and supported by large pillows. ? Lie on the floor with your legs on the seat of a sofa or chair. ? Lie on your side with your knees and hips bent and a pillow between your legs. ? Lie on your stomach if it does not make pain worse. · Do not sit up in bed, and avoid soft couches and twisted positions. Bed rest can help relieve pain at first, but it delays healing. Avoid bed rest after the first day of back pain. · Change positions every 30 minutes. If you must sit for long periods of time, take breaks from sitting. Get up and walk around, or lie in a comfortable position. · Try using a heating pad on a low or medium setting for 15 to 20 minutes every 2 or 3 hours. Try a warm shower in place of one session with the heating pad. · You can also try an ice pack for 10 to 15 minutes every 2 to 3 hours. Put a thin cloth between the ice pack and your skin. · Take pain medicines exactly as directed. ? If the doctor gave you a prescription medicine for pain, take it as prescribed. ? If you are not taking a prescription pain medicine, ask your doctor if you can take an over-the-counter medicine. · Take short walks several times a day. You can start with 5 to 10 minutes, 3 or 4 times a day, and work up to longer walks. Walk on level surfaces and avoid hills and stairs until your back is better. · Return to work and other activities as soon as you can. Continued rest without activity is usually not good for your back. · To prevent future back pain, do exercises to stretch and strengthen your back and stomach. Learn how to use good posture, safe lifting techniques, and proper body mechanics. When should you call for help? Call your doctor now or seek immediate medical care if: 
  · You have new or worsening numbness in your legs.  
  · You have new or worsening weakness in your legs. (This could make it hard to stand up.)  
  · You lose control of your bladder or bowels.  
 Watch closely for changes in your health, and be sure to contact your doctor if: 
  · You have a fever, lose weight, or don't feel well.  
  · You do not get better as expected. Where can you learn more? Go to http://keely-kacie.info/. Enter D652 in the search box to learn more about \"Back Pain: Care Instructions. \" Current as of: September 20, 2018 Content Version: 11.9 © 8145-8949 [employer]. Care instructions adapted under license by [employer] (which disclaims liability or warranty for this information). If you have questions about a medical condition or this instruction, always ask your healthcare professional. [place] [address] any warranty or liability for your use of this information. Learning About Relief for Back Pain What is back tension and strain? Back strain happens when you overstretch, or pull, a muscle in your back. You may hurt your back in an accident or when you exercise or lift something. Most back pain will get better with rest and time. You can take care of yourself at home to help your back heal. 
What can you do first to relieve back pain? When you first feel back pain, try these steps: 
· Walk. Take a short walk (10 to 20 minutes) on a level surface (no slopes, hills, or stairs) every 2 to 3 hours. Walk only distances you can manage without pain, especially leg pain. · Relax. Find a comfortable position for rest. Some people are comfortable on the floor or a medium-firm bed with a small pillow under their head and another under their knees. Some people prefer to lie on their side with a pillow between their knees. Don't stay in one position for too long. · Try heat or ice. Try using a heating pad on a low or medium setting, or take a warm shower, for 15 to 20 minutes every 2 to 3 hours. Or you can buy single-use heat wraps that last up to 8 hours. You can also try an ice pack for 10 to 15 minutes every 2 to 3 hours. You can use an ice pack or a bag of frozen vegetables wrapped in a thin towel. There is not strong evidence that either heat or ice will help, but you can try them to see if they help. You may also want to try switching between heat and cold. · Take pain medicine exactly as directed. ? If the doctor gave you a prescription medicine for pain, take it as prescribed. ? If you are not taking a prescription pain medicine, ask your doctor if you can take an over-the-counter medicine. What else can you do? · Stretch and exercise. Exercises that increase flexibility may relieve your pain and make it easier for your muscles to keep your spine in a good, neutral position. And don't forget to keep walking. · Do self-massage.  You can use self-massage to unwind after work or school or to energize yourself in the morning. You can easily massage your feet, hands, or neck. Self-massage works best if you are in comfortable clothes and are sitting or lying in a comfortable position. Use oil or lotion to massage bare skin. · Reduce stress. Back pain can lead to a vicious [place]: Distress about the pain tenses the muscles in your back, which in turn causes more pain. Learn how to relax your mind and your muscles to lower your stress. Where can you learn more? Go to http://keely-kacie.info/. Enter Y072 in the search box to learn more about \"Learning About Relief for Back Pain. \" Current as of: September 20, 2018 Content Version: 11.9 © 2315-7805 DrDoctor. Care instructions adapted under license by [employer] (which disclaims liability or warranty for this information). If you have questions about a medical condition or this instruction, always ask your healthcare professional. [place] [address] any warranty or liability for your use of this information. Neck Pain: Care Instructions Your Care Instructions You can have neck pain anywhere from the bottom of your head to the top of your shoulders. It can spread to the upper back or arms. Injuries, painting a ceiling, sleeping with your neck twisted, staying in one position for too long, and many other activities can cause neck pain. Most neck pain gets better with home care. Your doctor may recommend medicine to relieve pain or relax your muscles. He or she may suggest exercise and physical therapy to increase flexibility and relieve stress. You may need to wear a special (cervical) collar to support your neck for a day or two. Follow-up care is a key part of your treatment and safety. Be sure to make and go to all appointments, and call your doctor if you are having problems. It's also a good idea to know your test results and keep a list of the medicines you take. How can you care for yourself at home? · Try using a heating pad on a low or medium setting for 15 to 20 minutes every 2 or 3 hours. Try a warm shower in place of one session with the heating pad. · You can also try an ice pack for 10 to 15 minutes every 2 to 3 hours. Put a thin cloth between the ice and your skin. · Take pain medicines exactly as directed. ? If the doctor gave you a prescription medicine for pain, take it as prescribed. ? If you are not taking a prescription pain medicine, ask your doctor if you can take an over-the-counter medicine. · If your doctor recommends a cervical collar, wear it exactly as directed. When should you call for help? Call your doctor now or seek immediate medical care if: 
  · You have new or worsening numbness in your arms, buttocks or legs.  
  · You have new or worsening weakness in your arms or legs. (This could make it hard to stand up.)  
  · You lose control of your bladder or bowels.  
 Watch closely for changes in your health, and be sure to contact your doctor if: 
  · Your neck pain is getting worse.  
  · You are not getting better after 1 week.  
  · You do not get better as expected. Where can you learn more? Go to http://keely-kacie.info/. Enter [id number] in the search box to learn more about \"Neck Pain: Care Instructions. \" Current as of: September 20, 2018 Content Version: 11.9 © 9287-4071 [employer]ive. Care instructions adapted under license by [employer] (which disclaims liability or warranty for this information). If you have questions about a medical condition or this instruction, always ask your healthcare professional. [place] [address] any warranty or liability for your use of this information. Healthy Upper Back: Exercises Your Care Instructions Here are some examples of exercises for your upper back.  Start each exercise slowly. Ease off the exercise if you start to have pain. Your doctor or physical therapist will tell you when you can start these exercises and which ones will work best for you. How to do the exercises Lower neck and upper back stretch 1. Stretch your arms out in front of your body. Clasp one hand on top of your other hand. 2. Gently reach out so that you feel your shoulder blades stretching away from each other. 3. Gently bend your head forward. 4. Hold for 15 to 30 seconds. 5. Repeat 2 to 4 times. Midback stretch 1. Kneel on the floor, and sit back on your ankles. 2. Lean forward, place your hands on the floor, and stretch your arms out in front of you. Rest your head between your arms. 3. Gently push your chest toward the floor, reaching as far in front of you as possible. 4. Hold for 15 to 30 seconds. 5. Repeat 2 to 4 times. Shoulder rolls 1. Sit comfortably with your feet shoulder-width apart. You can also do this exercise while standing. 2. Roll your shoulders up, then back, and then down in a smooth, circular motion. 3. Repeat 2 to 4 times. Wall push-up 1. Stand against a wall with your feet about 12 to 24 inches back from the wall. If you feel any pain when you do this exercise, stand closer to the wall. 2. Place your hands on the wall slightly wider apart than your shoulders, and lean forward. 3. Gently lean your body toward the wall. Then push back to your starting position. Keep the motion smooth and controlled. 4. Repeat 8 to 12 times. Resisted shoulder blade squeeze 1. Sit or stand, holding the band in both hands in front of you. Keep your elbows close to your sides, bent at a 90-degree angle. Your palms should face up. 2. Squeeze your shoulder blades together, and move your arms to the outside, stretching the band. Be sure to keep your elbows at your sides while you do this. 3. Relax. 4. Repeat 8 to 12 times. Resisted rows 1. Put the band around a solid object, such as a bedpost, at about waist level. Hold one end of the band in each hand. 2. With your elbows at your sides and bent to 90 degrees, pull the band back to move your shoulder blades toward each other. Return to the starting position. 3. Repeat 8 to 12 times. Follow-up care is a key part of your treatment and safety. Be sure to make and go to all appointments, and call your doctor if you are having problems. It's also a good idea to know your test results and keep a list of the medicines you take. Where can you learn more? Go to http://keely-kacie.info/. Enter A413 in the search box to learn more about \"Healthy Upper Back: Exercises. \" Current as of: September 20, 2018 Content Version: 11.9 © 6788-6117 [employer]. Care instructions adapted under license by [employer] (which disclaims liability or warranty for this information). If you have questions about a medical condition or this instruction, always ask your healthcare professional. [address] any warranty or liability for your use of this information.

## 2019-03-29 NOTE — PROGRESS NOTES
RM 16 Chief Complaint Patient presents with  Back Pain Patient reports 8/10 mid-upper back pain, occuring for the past 2 weeks. 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 Currently taking Ibuprofen and took 6 days off but feels like pain is worsening. Reports falling in home Jan 2019.   
 
1. Have you been to the ER, urgent care clinic since your last visit? Hospitalized since your last visit? No 
 
2. Have you seen or consulted any other health care providers outside of the [address] [place] [address] since your last visit? Include any pap smears or colon screening. No 
 
Health Maintenance Due Topic Date Due  Pneumococcal 0-64 years (1 of 1 - PPSV23) 01/05/1987  PAP AKA CERVICAL CYTOLOGY  02/18/2018 Abuse Screening Questionnaire 3/29/2019 Do you ever feel afraid of your partner? Josey Lower Are you in a relationship with someone who physically or mentally threatens you? Josey Lower Is it safe for you to go home? Y  
 
3 most recent PHQ Screens 3/29/2019 Little interest or pleasure in doing things Not at all Feeling down, depressed, irritable, or hopeless Not at all Total Score PHQ 2 0

## 2019-03-29 NOTE — LETTER
NOTIFICATION RETURN TO WORK / SCHOOL 
 
3/29/2019 4:09 PM 
 
Ms. [address] [place] [address] [place] [address] To Whom It May Concern: 
 
Mikey Ordonez is currently under the care of [facility]. She will return to work/school on: Monday, April 1, 2019. Please excuse from today through return date above. If there are questions or concerns please have the patient contact our office.  
 
 
Sincerely, 
 
Maribel Hernández MD

## 2019-03-29 NOTE — PROGRESS NOTES
History of Present Illness:  
Evelyn Garg is a 46 y.o. female here for evaluation: Chief Complaint Patient presents with  Back Pain Patient reports 8/10 mid-upper back pain, occuring for the past 2 weeks. 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 Currently taking Ibuprofen and took 6 days off but feels like pain is worsening. Reports falling in home Jan 2019. Here to evaluate above. PCP here NP Petr Aparicio. Notes: 
She is now back at work--off with normal schedule for past 5-6 days. Worked last night. She is \"always doing something\" Notes onset one morning with \"achiness\". Started ibuprofen but didn't help resolve over time. She takes ibuprofen prior to activity as 4 x 200mg. This is not helping resolve pain. She is still able to work with pain, but feels like ibuprofen not helping. Reviewed mgt below. She has outside PT she has seen prior and will schedule eval there. Nursing screenings reviewed by provider at visit. Past Medical History:  
Diagnosis Date  Anemia NEC   
 on fe pill  Fibroid, uterine  Migraine  Postpartum depression   
 years ago  Rotator cuff injury 2014/2015 Prior to Admission medications Medication Sig Start Date End Date Taking? Authorizing Provider  
ibuprofen (MOTRIN) 200 mg tablet Take  by mouth every six (6) hours as needed for Pain. Yes Provider, Historical  
SUMAtriptan (IMITREX) 100 mg tablet TAKE 1 TABLET BY MOUTH ONCE AS NEEDED FOR MIGRAINE FOR UP TO 1 DOSE. MAY REPEAT AFTER 2 HOURS MAX 2 1/24/19  Yes Dwyane Curling T, NP  
ibuprofen (MOTRIN) 600 mg tablet Take 1 Tab by mouth every six (6) hours as needed for Pain. 1/10/19  Yes FELICE Gutierres  
topiramate (TOPAMAX) 50 mg tablet TAKE 1 TABLET BY MOUTH TWICE A DAY 7/17/18   Gregorio Alcazar NP  
hydrocortisone (ANUSOL-HC) 2.5 % rectal cream Insert  into rectum four (4) times daily. 4/18/18   KAMALA Moran Vitals: 03/29/19 1518 BP: 123/76 Pulse: 68 Resp: 16 Temp: 98.3 °F (36.8 °C) TempSrc: Oral  
SpO2: 94% Weight: 227 lb 2 oz (103 kg) Height: 5' 6\" (1.676 m) PainSc:   8 PainLoc: Back LMP: 09/17/2017 Body mass index is 36.66 kg/m². Physical Exam:  
 
Physical Exam  
Constitutional: She appears well-developed and well-nourished. No distress. HENT:  
Head: Normocephalic and atraumatic. Eyes: Conjunctivae are normal. Right eye exhibits no discharge. Left eye exhibits no discharge. No scleral icterus. Neck: Neck supple. Limited lateral rotation as worsens thoracic back pain. Cardiovascular: Normal rate, regular rhythm, normal heart sounds and intact distal pulses. Exam reveals no gallop and no friction rub. No murmur heard. Pulmonary/Chest: Effort normal and breath sounds normal. No respiratory distress. She has no wheezes. She has no rales. She exhibits no tenderness. Abdominal: Soft. Bowel sounds are normal. She exhibits no distension. There is no tenderness. Musculoskeletal: She exhibits tenderness. She exhibits no edema. Back: 
 
Neurological: She is alert. She exhibits normal muscle tone. Coordination normal.  
Skin: Skin is warm. No rash noted. She is not diaphoretic. No erythema. No pallor. Psychiatric: She has a normal mood and affect. Her behavior is normal. Judgment and thought content normal.  
 
Assessment and Plan: ICD-10-CM ICD-9-CM 1. Acute bilateral thoracic back pain M54.6 724.1 methocarbamol (ROBAXIN) 500 mg tablet REFERRAL TO PHYSICAL THERAPY 2. Musculoskeletal neck pain M54.2 723.1 methocarbamol (ROBAXIN) 500 mg tablet REFERRAL TO PHYSICAL THERAPY Medications, management and follow-up based on response reviewed at visit. Work excuse reviewed at visit. Follow-up and Dispositions · Return if symptoms worsen or fail to improve. reviewed diet, exercise and weight control 
reviewed medications and side effects in detail For additional documentation of information and/or recommendations discussed this visit, please see notes in instructions. Plan and evaluation (above) reviewed with pt at visit Patient voiced understanding of plan and provided with time to ask/review questions. After Visit Summary (AVS) provided to pt after visit with additional instructions as needed/reviewed.

## 2019-04-16 ENCOUNTER — OFFICE VISIT (OUTPATIENT)
Dept: INTERNAL MEDICINE CLINIC | Age: 39
End: 2019-04-16

## 2019-04-16 NOTE — PROGRESS NOTES
RM  
 
No chief complaint on file. 1. Have you been to the ER, urgent care clinic since your last visit? Hospitalized since your last visit? No 
 
2. Have you seen or consulted any other health care providers outside of the [address] [place] [address] since your last visit? Include any pap smears or colon screening. No 
 
Health Maintenance Due Topic Date Due  Pneumococcal 0-64 years (1 of 1 - PPSV23) 01/05/1987  PAP AKA CERVICAL CYTOLOGY  02/18/2018 Abuse Screening Questionnaire 3/29/2019 Do you ever feel afraid of your partner? Kathi Lei Are you in a relationship with someone who physically or mentally threatens you? Kathi Lei Is it safe for you to go home? Y  
 
3 most recent PHQ Screens 3/29/2019 Little interest or pleasure in doing things Not at all Feeling down, depressed, irritable, or hopeless Not at all Total Score PHQ 2 0

## 2019-04-22 ENCOUNTER — OFFICE VISIT (OUTPATIENT)
Dept: INTERNAL MEDICINE CLINIC | Age: 39
End: 2019-04-22

## 2019-04-22 VITALS
RESPIRATION RATE: 16 BRPM | HEIGHT: 66 IN | DIASTOLIC BLOOD PRESSURE: 86 MMHG | SYSTOLIC BLOOD PRESSURE: 126 MMHG | HEART RATE: 59 BPM | OXYGEN SATURATION: 94 % | BODY MASS INDEX: 37.28 KG/M2 | WEIGHT: 232 LBS | TEMPERATURE: 98.1 F

## 2019-04-22 DIAGNOSIS — R60.0 BILATERAL LEG EDEMA: ICD-10-CM

## 2019-04-22 DIAGNOSIS — J30.89 ENVIRONMENTAL AND SEASONAL ALLERGIES: ICD-10-CM

## 2019-04-22 DIAGNOSIS — G43.001 MIGRAINE WITHOUT AURA AND WITH STATUS MIGRAINOSUS, NOT INTRACTABLE: ICD-10-CM

## 2019-04-22 DIAGNOSIS — M54.6 THORACIC BACK PAIN, UNSPECIFIED BACK PAIN LATERALITY, UNSPECIFIED CHRONICITY: Primary | ICD-10-CM

## 2019-04-22 RX ORDER — DESLORATADINE 5 MG/1
5 TABLET ORAL
Qty: 90 TAB | Refills: 1 | Status: SHIPPED | OUTPATIENT
Start: 2019-04-22

## 2019-04-22 RX ORDER — SUMATRIPTAN 100 MG/1
TABLET, FILM COATED ORAL
Qty: 15 TAB | Refills: 0 | Status: SHIPPED | OUTPATIENT
Start: 2019-04-22

## 2019-04-22 NOTE — PATIENT INSTRUCTIONS
You can use one of the following Over The Counter (OTC) anti-histamines such as Zyrtec (cetirizine 10mg), Claritin (loratadine 10mg) or Allegra (fexofenadine 180mg) or generics. You would take one tab daily as needed for allergy symptoms, itching. Xyzal (levocetirizine 5mg once daily) is a newer formulation of the active ingredient in Zyrtec/cetirizine. Clarinex (desloratadine 5mg once daily) is another formulation of the active ingredient in Claritin/loratadine.

## 2019-04-22 NOTE — PROGRESS NOTES
History of Present Illness:  
Ever Iyer is a 46 y.o. female here for evaluation: Chief Complaint Patient presents with  Form Completion Patient request a return to work note to return ASAP on full duty. Patient reports if standing for long periods of times feet become swollen, also reports hands swelling when active or feeling hot. Here to review return to work note. She notes just needs return for work on routine letterhead--no specific form to complete at this time. She was seen by me and excused through 4-1-19. That visit was on 3-29-19. Interim office notes indicated request for records and plan to see PCP here to review return to work note. She notes in interim, she has done PT and completed therapy. She had completed with [facility] and no further therapy scheduled there. She has 2wk check with them. She had worked with her workman's comp, but not covered with them due to delay in reporting. She works as CNA with direct patient care. She would like to return to work on Wednesday 4-24-19. She has done exercises and continues. She is only taking ibuprofen PRN end of day for MSK pain. Not using muscle relaxant at all now. Migraine HA: 
She notes had reviewed with PCP prior--regarding dose increase/stoppigng Topamax--unable to find in prior notes at visit. Notes: 
Patient would like a higher dose of Topamax, states she still has headaches. Reviewed she would need to see PCP to re-start Topamax. She would prefer to manage with PCP not neurology due to specialty co-pays. Reviewed sumatriptan refill at visit. Swelling LE's: 
She had labs done with Dr. Luz Camera Jan 2019 which were normal for TSH and CMP--reviewed in light of concern for leg/hand swelling noted above. Reviewed to re-evaluate with PCP if persisting. Reviewed of encounter diagnoses, do not indicate she has had this addressed previously. She has used support stockings. Reviewed evaluate at follow-up with PCP. Reviewed salt restriction and support stockings PRN. Allergies: 
Notes: 
Patient would like to know if she can take medication for allergies that insurance will cover. She has not had specific meds for allergies she knows are covered with current insurance. Clarinex is listed as covered Tier 2 or 3--covered with electronic formulary decision support. Other alternatives listed as not reimbursable for:  cetirizine, Zyrtec, Xyzal, Allegra, Claritin. Reviewed with pt at visit. Nursing screenings reviewed by provider at visit. Prior to Admission medications Medication Sig Start Date End Date Taking? Authorizing Provider SUMAtriptan (IMITREX) 100 mg tablet TAKE 1 TABLET BY MOUTH ONCE AS NEEDED FOR MIGRAINE FOR UP TO 1 DOSE. MAY REPEAT AFTER 2 HOURS MAX 2 1/24/19  Yes Lela MARTINEZ NP  
ibuprofen (MOTRIN) 600 mg tablet Take 1 Tab by mouth every six (6) hours as needed for Pain. 1/10/19  Yes FELICE Leyva  
ibuprofen (MOTRIN) 200 mg tablet Take  by mouth every six (6) hours as needed for Pain. Provider, Historical  
methocarbamol (ROBAXIN) 500 mg tablet Take 1 Tab by mouth four (4) times daily as needed for Other (muscle spasm/pain, back pain). 3/29/19   Esa Pacheco MD  
topiramate (TOPAMAX) 50 mg tablet TAKE 1 TABLET BY MOUTH TWICE A DAY 7/17/18   [place] Jeter NP  
hydrocortisone (ANUSOL-HC) 2.5 % rectal cream Insert  into rectum four (4) times daily. 4/18/18   [place] Jeter NP Robaxin and Topamax listed by pt as not current. ROS Vitals:  
 04/22/19 0034 BP: 126/86 Pulse: (!) 59 Resp: 16 Temp: 98.1 °F (36.7 °C) TempSrc: Oral  
SpO2: 94% Weight: 232 lb (105.2 kg) Height: 5' 6\" (1.676 m) PainSc:   0 - No pain LMP: 09/17/2017 Body mass index is 37.45 kg/m². Physical Exam:  
 
Physical Exam  
Constitutional: She appears well-developed and well-nourished. No distress.   
HENT:  
 Head: Normocephalic and atraumatic. Eyes: Conjunctivae are normal. Right eye exhibits no discharge. Left eye exhibits no discharge. No scleral icterus. Cardiovascular: Normal rate, regular rhythm, normal heart sounds and intact distal pulses. Exam reveals no gallop and no friction rub. No murmur heard. Pulmonary/Chest: Effort normal and breath sounds normal. No respiratory distress. She has no wheezes. She has no rales. She exhibits no tenderness. Abdominal: Soft. Bowel sounds are normal. She exhibits no distension. There is no tenderness. Musculoskeletal: She exhibits edema (trace to 1+ bilat distal LE edema. ). She exhibits no tenderness. Neurological: She is alert. She exhibits normal muscle tone. Coordination normal.  
Skin: Skin is warm. No rash noted. She is not diaphoretic. No erythema. No pallor. Psychiatric: She has a normal mood and affect. Her behavior is normal. Judgment and thought content normal.  
 
 
Assessment and Plan: ICD-10-CM ICD-9-CM 1. Thoracic back pain, unspecified back pain laterality, unspecified chronicity M54.6 724.1 2. Migraine without aura and with status migrainosus, not intractable G43.001 346.12 SUMAtriptan (IMITREX) 100 mg tablet 3. Environmental and seasonal allergies J30.89 477.8 desloratadine (CLARINEX) 5 mg tablet 4. Bilateral leg edema R60.0 782.3 1. Letter completed to return to work 4-24-19. 
 
2.  Refill reviewed. Will re-eval at follow-up appt with PCP to determine if can re-start Topamax--as reviewed above. 3.  Medication and OTC alternatives reviewed. 4.  Low-salt diet and support stockings reviewed. Follow-up/re-evaluate with PCP. Pt notes had used diuretic prior post-partum with OB provider. Follow-up and Dispositions · Return in about 3 weeks (around 5/13/2019), or if symptoms worsen or fail to improve, for headache, swelling review with PCP--KAMALA Reyes. lab results and schedule of future lab studies reviewed with patient 
reviewed diet, exercise and weight control 
reviewed medications and side effects in detail For additional documentation of information and/or recommendations discussed this visit, please see notes in instructions. Plan and evaluation (above) reviewed with pt at visit Patient voiced understanding of plan and provided with time to ask/review questions. After Visit Summary (AVS) provided to pt after visit with additional instructions as needed/reviewed. No future appointments.

## 2019-04-22 NOTE — LETTER
NOTIFICATION RETURN TO WORK / SCHOOL 
 
4/22/2019 10:40 AM 
 
Ms. [address] [place] [address] [place] [address] To Whom It May Concern: 
 
Mikey Ordonez is currently under the care of [facility]. She will return to work/school on: Wednesday, April 24, 2019--full duty with no restrictions. If there are questions or concerns please have the patient contact our office.  
 
 
Sincerely, 
 
Mary García MD

## 2019-04-22 NOTE — PROGRESS NOTES
RM 17 Patient would like a higher dose of Topamax, states she still has headaches. Patient would like to know if she can take medication for allergies that insurance will cover. Chief Complaint Patient presents with  Form Completion Patient request a return to work note to return ASAP on full duty. Patient reports if standing for long periods of times feet become swollen, also reports hands swelling when active or feeling hot. 1. Have you been to the ER, urgent care clinic since your last visit? Hospitalized since your last visit? No 
 
2. Have you seen or consulted any other health care providers outside of the [address] [place] [address] since your last visit? Include any pap smears or colon screening. No 
 
Health Maintenance Due Topic Date Due  Pneumococcal 0-64 years (1 of 1 - PPSV23) 01/05/1987  PAP AKA CERVICAL CYTOLOGY  02/18/2018 Abuse Screening Questionnaire 4/22/2019 Do you ever feel afraid of your partner? Carlie Distel Are you in a relationship with someone who physically or mentally threatens you? Carlie Distel Is it safe for you to go home? Y  
 
3 most recent PHQ Screens 4/22/2019 Little interest or pleasure in doing things Not at all Feeling down, depressed, irritable, or hopeless Not at all Total Score PHQ 2 0

## 2020-03-17 NOTE — PROGRESS NOTES
CT Surgery physical Therapy EVALUATION/DISCHARGE  Patient: Chung Gold (37 y.o. female)  Date: 10/12/2017  Primary Diagnosis: FIRBOIDS/PELVIC PAIN  Fibroids  Procedure(s) (LRB):  DAVINCI LAPAROSCOPIC TOTAL HYSTRECTOMY WITH OVARIAN PRESERVATION, BILATERAL SALPINGECTOMY,/CYSTOSCOPY (N/A)  CYSTOSCOPY (N/A) 1 Day Post-Op   Precautions:   Fall  ASSESSMENT :  Based on the objective data described below, the patient presents with generally decreased strength, balance, ROM, and increased pain limiting her functional mobility from reported baseline status of independent. Pt opted SPC trial this date and would like to obtain this DME for home use. PT recommended picking up device for minimal fee at pharmacy as insurance will only cover DME every 5 years (would hate to use resources on something inexpensive if pt can afford out of pocket). Pt agreeable. At this time, pt has demonstrated ability to complete bed mobility, transfers, gait, and stairs per home needs. Pt with no further skilled PT needs at this time though recommend hourly walking and sitting in chair throughout meals and day. Please contact with any additional needs should they arise before D/C. Skilled physical therapy is not indicated at this time. PLAN :  Discharge Recommendations: None  Further Equipment Recommendations for Discharge: pt to obtain [facility] out of pocket     SUBJECTIVE:   Patient stated The ice packs have helped. ..    OBJECTIVE DATA SUMMARY:   HISTORY:    Past Medical History:   Diagnosis Date    Anemia NEC     on fe pill    Fibroid, uterine     Migraine     Postpartum depression     years ago     Past Surgical History:   Procedure Laterality Date    HX DILATION AND CURETTAGE  1998,2000,2001,2003    x4    HX GYN      BTL 2013    HX GYN  2015    uterine ablation     Prior Level of Function/Home Situation: independent and caring for 2 children  Personal factors and/or comorbidities impacting plan of care: supportive though working spouse    Home Situation  Home Environment: Apartment  # Steps to Enter: 4  Rails to Enter: Yes  Hand Rails : Left  One/Two Story Residence: Other (Comment) [place])  # of Interior Steps: 11  Interior Rails: Left  Lift Chair Available: No  Living Alone: No  Support Systems: Spouse/Significant Other/Partner, Family member(s)  Patient Expects to be Discharged to[de-identified] Apartment  Current DME Used/Available at Home: None    EXAMINATION/PRESENTATION/DECISION MAKING:   Critical Behavior:  Neurologic State: Alert  Orientation Level: Appropriate for age  Cognition: Follows commands  Safety/Judgement: Fall prevention  Hearing: Auditory  Auditory Impairment: None  Range Of Motion:  AROM: Generally decreased, functional                       Strength:    Strength: Generally decreased, functional                    Tone & Sensation:   Tone: Normal              Sensation: Intact               Coordination:  Coordination: Within functional limits  Functional Mobility:  Bed Mobility:  Rolling: Modified independent  Supine to Sit: Modified independent; Additional time     Scooting: Modified independent; Additional time  Transfers:  Sit to Stand: Modified independent; Additional time  Stand to Sit: Modified independent; Additional time                       Balance:   Sitting: Intact  Standing: Intact; With support  Ambulation/Gait Training:  Distance (ft): 200 Feet (ft)  Assistive Device: Gait belt;Cane, straight  Ambulation - Level of Assistance: Contact guard assistance; Additional time;Supervision     Gait Description (WDL): Exceptions to WDL  Gait Abnormalities: Antalgic;Decreased step clearance (slow, tentative gait pattern)              Speed/Angie: Slow;Pace decreased (<100 feet/min)  Step Length: Right shortened;Left shortened                         Stairs:  Number of Stairs Trained: 13  Stairs - Level of Assistance:  Additional time;Stand-by asssistance   Rail Use: Left  (opposite SPC use)     Therapeutic Exercises:   Encouraged Kegel therex and postural therex \"down the road\" post op, supine/UE overhead stretching to prevent adhesions     Functional Measure:  Tinetti test:    Sitting Balance: 1  Arises: 1  Attempts to Rise: 1  Immediate Standing Balance: 1  Standing Balance: 1  Nudged: 0  Eyes Closed: 0  Turn 360 Degrees - Continuous/Discontinuous: 0  Turn 360 Degrees - Steady/Unsteady: 1  Sitting Down: 1  Balance Score: 7  Indication of Gait: 0  R Step Length/Height: 1  L Step Length/Height: 1  R Foot Clearance: 1  L Foot Clearance: 1  Step Symmetry: 1  Step Continuity: 0  Path: 1  Trunk: 0  Walking Time: 0  Gait Score: 6  Total Score: 13       Tinetti Test and G-code impairment scale:  Percentage of Impairment CH    0%   CI    1-19% CJ    20-39% CK    40-59% CL    60-79% CM    80-99% CN     100%   Tinetti  Score 0-28 28 23-27 17-22 12-16 6-11 1-5 0       Tinetti Tool Score Risk of Falls  <19 = High Fall Risk  19-24 = Moderate Fall Risk  25-28 = Low Fall Risk  Tinetti ME. Performance-Oriented Assessment of Mobility Problems in Elderly Patients. [facility]; [id number]. (Scoring Description: PT Bulletin Feb. 10, 1993)    Older adults: Darrell Jones et al, 2009; n = 1000 [facility] elderly evaluated with ABC, SUNDAY, ADL, and IADL)  · Mean SUNDAY score for males aged 69-68 years = 26.21(3.40)  · Mean SUNDAY score for females age 69-68 years = 25.16(4.30)  · Mean SUNDAY score for males over 80 years = 23.29(6.02)  · Mean SUNDAY score for females over 80 years = 17.20(8.32)         G codes: In compliance with CMSs Claims Based Outcome Reporting, the following G-code set was chosen for this patient based on their primary functional limitation being treated: The outcome measure chosen to determine the severity of the functional limitation was the Tinetti with a score of 13/28 which was correlated with the impairment scale.     ? Mobility - Walking and Moving Around:     - CURRENT STATUS: CK - 40%-59% impaired, limited or restricted    - GOAL STATUS: CK - 40%-59% impaired, limited or restricted    - D/C STATUS:  CK - 40%-59% impaired, limited or restricted       Pain:Pain Scale 1: Numeric (0 - 10)  Pain Intensity 1: 6  Pain Location 1: Abdomen  Activity Tolerance:   NAD  After treatment:   [x]   Patient left in no apparent distress sitting up in chair  []   Patient left in no apparent distress in bed  [x]   Call bell left within reach  [x]   Nursing notified  []   Caregiver present  []   Bed alarm activated    COMMUNICATION/EDUCATION:   Communication/Collaboration:  [x]   Fall prevention education was provided and the patient/caregiver indicated understanding. [x]   Patient/family have participated as able and agree with findings and recommendations. []   Patient is unable to participate in plan of care at this time.   Findings and recommendations were discussed with: Registered Nurse    Thank you for this referral.  Joseline Medina   Time Calculation: 47 mins

## (undated) DEVICE — PAD SANIT NPKN 4IN GRD

## (undated) DEVICE — HANDLE LT SNAP ON ULT DURABLE LENS FOR TRUMPF ALC DISPOSABLE

## (undated) DEVICE — PAD 05IN BASE 3IN PEAK M DENS CONVOLUTED FOAM

## (undated) DEVICE — TIP COVER ACCESSORY

## (undated) DEVICE — BLADELESS OPTICAL TROCAR WITH FIXATION CANNULA: Brand: VERSAONE

## (undated) DEVICE — PREP PAD BNS: Brand: CONVERTORS

## (undated) DEVICE — SUTURE MCRYL SZ 4-0 L27IN ABSRB UD L19MM PS-2 1/2 CIR PRIM Y426H

## (undated) DEVICE — TUBING INSUF HEAT STRL 10 FT --

## (undated) DEVICE — CYSTO/BLADDER IRRIGATION SET, REGULATING CLAMP

## (undated) DEVICE — APPLICATOR SEAL FLOSEAL 5MM+ --

## (undated) DEVICE — FLOSEAL HEMOSTATIC MATRIX, 10 ML: Brand: FLOSEAL

## (undated) DEVICE — CANNULA SEAL

## (undated) DEVICE — NDL SPNE QNCKE 18GX3.5IN LF --

## (undated) DEVICE — SOLUTION IV 1000ML 0.9% SOD CHL

## (undated) DEVICE — SUTURE STRATAFIX SPRL PDS + SZ 2-0 L6IN ABSRB VLT L36MM SXPP1B409

## (undated) DEVICE — REM POLYHESIVE ADULT PATIENT RETURN ELECTRODE: Brand: VALLEYLAB

## (undated) DEVICE — NEEDLE INSUF L120MM DIA2MM DISP FOR PNEUMOPERI ENDOPATH

## (undated) DEVICE — AGENT HEMSTAT W2XL14IN OXIDIZED REGENERATED CELOS ABSRB FOR

## (undated) DEVICE — OBTRTR BLDELSS 8MM DISP -- DA VINCI - SNGL USE

## (undated) DEVICE — GOWN,SIRUS,FABRNF,XL,20/CS: Brand: MEDLINE

## (undated) DEVICE — TROCAR ENDOSCP SHFT L150MM DIA12MM BLDELSS ENDOPATH XCEL

## (undated) DEVICE — NEEDLE HYPO 18GA L1.5IN PNK S STL HUB POLYPR SHLD REG BVL

## (undated) DEVICE — (D)SYR 10ML 1/5ML GRAD NSAF -- PKGING CHANGE USE ITEM 338027

## (undated) DEVICE — BIPOLAR FORCEPS CORD,BANANA LEADS: Brand: VALLEYLAB

## (undated) DEVICE — MONOPOLAR CURVED SCISSORS: Brand: ENDOWRIST

## (undated) DEVICE — BARRIER TISS ADH ABSRB 3X4IN -- GYNECARE INTERCEED

## (undated) DEVICE — ELECTRO LUBE IS A SINGLE PATIENT USE DEVICE THAT IS INTENDED TO BE USED ON ELECTROSURGICAL ELECTRODES TO REDUCE STICKING.: Brand: KEY SURGICAL ELECTRO LUBE

## (undated) DEVICE — SURGICAL PROCEDURE PACK GYN LAPAROSCOPY CUST SMH LF

## (undated) DEVICE — BASIC PACK: Brand: CONVERTORS

## (undated) DEVICE — VISUALIZATION SYSTEM: Brand: CLEARIFY

## (undated) DEVICE — MEGA NEEDLE DRIVER: Brand: ENDOWRIST;DAVINCI SI

## (undated) DEVICE — 3M™ DURAPORE™ SURGICAL TAPE 1538-3, 3 INCH X 10 YARD (7,5CM X 9,1M), 4 ROLLS/BOX: Brand: 3M™ DURAPORE™

## (undated) DEVICE — TRAY PREP DRY W/ PREM GLV 2 APPL 6 SPNG 2 UNDPD 1 OVERWRAP

## (undated) DEVICE — INFECTION CONTROL KIT SYS

## (undated) DEVICE — DRAPE,REIN 53X77,STERILE: Brand: MEDLINE

## (undated) DEVICE — KENDALL SCD EXPRESS SLEEVES, KNEE LENGTH, MEDIUM: Brand: KENDALL SCD

## (undated) DEVICE — KIT DRP 3 ARM ACC DISP ENDOWRIST DA VINCI SI

## (undated) DEVICE — SYRINGE MED 20ML STD CLR PLAS LUERLOCK TIP N CTRL DISP

## (undated) DEVICE — Device

## (undated) DEVICE — DERMABOND SKIN ADH 0.7ML -- DERMABOND ADVANCED 12/BX

## (undated) DEVICE — (D)PREP SKN CHLRAPRP APPL 26ML -- CONVERT TO ITEM 371833

## (undated) DEVICE — TRAY CATH 16F URIN MTR LTX -- CONVERT TO ITEM 363111